# Patient Record
Sex: MALE | Race: WHITE
[De-identification: names, ages, dates, MRNs, and addresses within clinical notes are randomized per-mention and may not be internally consistent; named-entity substitution may affect disease eponyms.]

---

## 2018-06-09 ENCOUNTER — HOSPITAL ENCOUNTER (INPATIENT)
Dept: HOSPITAL 25 - ED | Age: 78
LOS: 4 days | Discharge: NURSING, REHAB OR LONG-TERM CARE | DRG: 470 | End: 2018-06-13
Attending: HOSPITALIST | Admitting: INTERNAL MEDICINE
Payer: MEDICARE

## 2018-06-09 DIAGNOSIS — D62: ICD-10-CM

## 2018-06-09 DIAGNOSIS — Z79.01: ICD-10-CM

## 2018-06-09 DIAGNOSIS — N40.0: ICD-10-CM

## 2018-06-09 DIAGNOSIS — Z89.022: ICD-10-CM

## 2018-06-09 DIAGNOSIS — I10: ICD-10-CM

## 2018-06-09 DIAGNOSIS — Z87.891: ICD-10-CM

## 2018-06-09 DIAGNOSIS — E78.5: ICD-10-CM

## 2018-06-09 DIAGNOSIS — S72.002A: Primary | ICD-10-CM
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
## 2018-06-09 DIAGNOSIS — Z79.82: ICD-10-CM

## 2018-06-09 DIAGNOSIS — Z85.820: ICD-10-CM
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
## 2018-06-09 LAB
BASOPHILS # BLD AUTO: 0 10^3/UL (ref 0–0.2)
EOSINOPHIL # BLD AUTO: 0 10^3/UL (ref 0–0.6)
HCT VFR BLD AUTO: 46 % (ref 42–52)
HGB BLD-MCNC: 15.5 G/DL (ref 14–18)
INR PPP/BLD: 0.99 (ref 0.77–1.02)
LYMPHOCYTES # BLD AUTO: 0.7 10^3/UL (ref 1–4.8)
MCH RBC QN AUTO: 32 PG (ref 27–31)
MCHC RBC AUTO-ENTMCNC: 34 G/DL (ref 31–36)
MCV RBC AUTO: 95 FL (ref 80–94)
MONOCYTES # BLD AUTO: 1 10^3/UL (ref 0–0.8)
NEUTROPHILS # BLD AUTO: 10.9 10^3/UL (ref 1.5–7.7)
NRBC # BLD AUTO: 0 10^3/UL
NRBC BLD QL AUTO: 0
PLATELET # BLD AUTO: 202 10^3/UL (ref 150–450)
RBC # BLD AUTO: 4.87 10^6/UL (ref 4–5.4)
WBC # BLD AUTO: 12.6 10^3/UL (ref 3.5–10.8)

## 2018-06-09 PROCEDURE — 93005 ELECTROCARDIOGRAM TRACING: CPT

## 2018-06-09 PROCEDURE — 85049 AUTOMATED PLATELET COUNT: CPT

## 2018-06-09 PROCEDURE — 88305 TISSUE EXAM BY PATHOLOGIST: CPT

## 2018-06-09 PROCEDURE — 85610 PROTHROMBIN TIME: CPT

## 2018-06-09 PROCEDURE — 85730 THROMBOPLASTIN TIME PARTIAL: CPT

## 2018-06-09 PROCEDURE — 36415 COLL VENOUS BLD VENIPUNCTURE: CPT

## 2018-06-09 PROCEDURE — C1713 ANCHOR/SCREW BN/BN,TIS/BN: HCPCS

## 2018-06-09 PROCEDURE — 80053 COMPREHEN METABOLIC PANEL: CPT

## 2018-06-09 PROCEDURE — 82550 ASSAY OF CK (CPK): CPT

## 2018-06-09 PROCEDURE — 0SRB02A REPLACEMENT OF LEFT HIP JOINT WITH METAL ON POLYETHYLENE SYNTHETIC SUBSTITUTE, UNCEMENTED, OPEN APPROACH: ICD-10-PCS | Performed by: ORTHOPAEDIC SURGERY

## 2018-06-09 PROCEDURE — 72170 X-RAY EXAM OF PELVIS: CPT

## 2018-06-09 PROCEDURE — 85014 HEMATOCRIT: CPT

## 2018-06-09 PROCEDURE — 71045 X-RAY EXAM CHEST 1 VIEW: CPT

## 2018-06-09 PROCEDURE — 88311 DECALCIFY TISSUE: CPT

## 2018-06-09 PROCEDURE — 85018 HEMOGLOBIN: CPT

## 2018-06-09 PROCEDURE — 80048 BASIC METABOLIC PNL TOTAL CA: CPT

## 2018-06-09 PROCEDURE — 99283 EMERGENCY DEPT VISIT LOW MDM: CPT

## 2018-06-09 PROCEDURE — C1776 JOINT DEVICE (IMPLANTABLE): HCPCS

## 2018-06-09 PROCEDURE — 85025 COMPLETE CBC W/AUTO DIFF WBC: CPT

## 2018-06-09 RX ADMIN — HEPARIN SODIUM SCH UNITS: 5000 INJECTION INTRAVENOUS; SUBCUTANEOUS at 21:36

## 2018-06-09 RX ADMIN — SODIUM CHLORIDE SCH MLS/HR: 900 IRRIGANT IRRIGATION at 13:33

## 2018-06-09 NOTE — RAD
INDICATION: Left leg and hip pain after a fall from a truck



COMPARISON: None.

 

TECHNIQUE: Single AP view of the chest was obtained.



FINDINGS: 



The heart and mediastinum exhibit normal size and contour. There is mild calcified

atherosclerosis overlying the arch of the aorta.



The lungs are grossly clear. There is no evidence of a large pleural effusion.



Visualized bones are normal for the patient's age.



IMPRESSION:  No radiographic evidence for acute cardiopulmonary abnormality on this single

AP view chest x-ray.

## 2018-06-09 NOTE — ED
Lower Extremity





- HPI Summary


HPI Summary: 


Patient is a 77-year-old male who presents emergency department for a left hip 

injury that occurred 3 days ago.  Patient states he was in the back of his 

pickup truck when he fell off and landed onto left hip.  He denies head injury 

or loss of consciousness.  Patient resides at home by himself with help.  

States he's been using a walker to ambulate the last few days.  Denies numbness

, tingling or weakness. Otherwise denies recent illness, chest pain, shortness 

of breath, abdominal pain, vomiting, diarrhea, urinary symptoms, fever, back 

pain. Is not anticoagulated. Medical history of hypertension and high 

cholesterol.  Symptoms are moderate in severity.  Walking makes symptoms worse.

  Rest makes symptoms better.  Pain is minimal at rest.








- History of Current Complaint


Chief Complaint: EDExtremityLower


Stated Complaint: LT HIP PAIN


Time Seen by Provider: 06/09/18 09:01


Hx Obtained From: Patient


Pain Intensity: 6





- Allergies/Home Medications


Allergies/Adverse Reactions: 


 Allergies











Allergy/AdvReac Type Severity Reaction Status Date / Time


 


No Known Allergies Allergy   Verified 08/27/15 10:38











Home Medications: 


 Home Medications





Aspirin EC TAB* [Ecotrin EC Low Dose 81 MG*] 81 mg PO DAILY 06/09/18 [History 

Confirmed 06/09/18]


Atorvastatin* [Lipitor*] 40 mg PO DAILY 06/09/18 [History Confirmed 06/09/18]


Lisinopril TAB* [Prinivil TAB*] 5 mg PO DAILY 06/09/18 [History Confirmed 06/09/ 18]











PMH/Surg Hx/FS Hx/Imm Hx


Previously Healthy: Yes


Sensory History: Reports: Hx Contacts or Glasses - GLASSES


   Denies: Hx Hearing Aid


Opthamlomology History: Reports: Hx Contacts or Glasses - GLASSES


Infectious Disease History: No


Infectious Disease History: 


   Denies: Traveled Outside the [place] in Last 30 Days





- Social History


Occupation: Retired


Lives: Alone


Alcohol Use: None


Substance Use Type: Reports: None


Smoking Status (MU): Former Smoker


Amount Used/How Often: 1 PPD X 20-30 YEARS


Have You Smoked in the Last Year: No





Review of Systems


Constitutional: Negative


Negative: Fever, Chills


Eyes: Negative


ENT: Negative


Cardiovascular: Negative


Negative: Palpitations, Chest Pain


Respiratory: Negative


Negative: Shortness Of Breath, Cough


Gastrointestinal: Negative


Negative: Abdominal Pain, Vomiting, Diarrhea, Nausea


Genitourinary: Negative


Positive: Other - Left hip pain


Negative: Weakness, Paresthesia, Numbness


All Other Systems Reviewed And Are Negative: Yes





Physical Exam


Triage Information Reviewed: Yes


Vital Signs On Initial Exam: 


 Initial Vitals











Temp Pulse Resp BP Pulse Ox


 


 98 F   77   18   151/96   90 


 


 06/09/18 08:51  06/09/18 08:51  06/09/18 08:51  06/09/18 08:51  06/09/18 08:51











Vital Signs Reviewed: Yes


Appearance: Positive: Well-Appearing - Patient lying in bed in no acute 

distress.  Talkative.  Pleasant.  Family present.


Head/Face: Positive: Normal Head/Face Inspection


Eyes: Positive: Normal


Neck: Positive: Supple


Respiratory/Lung Sounds: Positive: Clear to Auscultation, Breath Sounds Present


Cardiovascular: Positive: Normal, RRR


Abdomen Description: Positive: Nontender, Soft


Musculoskeletal: Positive: Other - Left lower extremity is shortened and 

externally rotated.  Good palpable pedal pulse.  No calf tenderness or leg 

edema.  No wounds or infection.


Neurological: Positive: Normal, CN Intact II-III


Psychiatric: Positive: Affect/Mood Appropriate





Diagnostics





- Vital Signs


 Vital Signs











  Temp Pulse Resp BP Pulse Ox


 


 06/09/18 08:51  98 F  77  18  151/96  90














- Laboratory


Lab Results: 


 Lab Results











  06/09/18 06/09/18 06/09/18 Range/Units





  09:09 09:09 09:32 


 


WBC  12.6 H    (3.5-10.8)  10^3/ul


 


RBC  4.87    (4.0-5.4)  10^6/ul


 


Hgb  15.5    (14.0-18.0)  g/dl


 


Hct  46    (42-52)  %


 


MCV  95 H    (80-94)  fL


 


MCH  32 H    (27-31)  pg


 


MCHC  34    (31-36)  g/dl


 


RDW  14    (10.5-15)  %


 


Plt Count  202    (150-450)  10^3/ul


 


MPV  7.4    (7.4-10.4)  um3


 


Neut % (Auto)  86.5 H    (38-83)  %


 


Lymph % (Auto)  5.2 L    (25-47)  %


 


Mono % (Auto)  7.9 H    (0-7)  %


 


Eos % (Auto)  0.1    (0-6)  %


 


Baso % (Auto)  0.3    (0-2)  %


 


Absolute Neuts (auto)  10.9 H    (1.5-7.7)  10^3/ul


 


Absolute Lymphs (auto)  0.7 L    (1.0-4.8)  10^3/ul


 


Absolute Monos (auto)  1.0 H    (0-0.8)  10^3/ul


 


Absolute Eos (auto)  0    (0-0.6)  10^3/ul


 


Absolute Basos (auto)  0    (0-0.2)  10^3/ul


 


Absolute Nucleated RBC  0    10^3/ul


 


Nucleated RBC %  0    


 


INR (Anticoag Therapy)   0.99   (0.77-1.02)  


 


Sodium    141  (139-145)  mmol/L


 


Potassium    4.2  (3.5-5.0)  mmol/L


 


Chloride    105  (101-111)  mmol/L


 


Carbon Dioxide    27  (22-32)  mmol/L


 


Anion Gap    9  (2-11)  mmol/L


 


BUN    22  (6-24)  mg/dL


 


Creatinine    1.22 H  (0.67-1.17)  mg/dL


 


Est GFR ( Amer)    74.1  (>60)  


 


Est GFR (Non-Af Amer)    57.6  (>60)  


 


BUN/Creatinine Ratio    18.0  (8-20)  


 


Glucose    122 H  ()  mg/dL


 


Calcium    9.7  (8.6-10.3)  mg/dL


 


Total Bilirubin    1.20 H  (0.2-1.0)  mg/dL


 


AST    43 H  (13-39)  U/L


 


ALT    30  (7-52)  U/L


 


Alkaline Phosphatase    74  ()  U/L


 


Total Creatine Kinase    878 H  ()  U/L


 


Total Protein    7.2  (6.4-8.9)  g/dL


 


Albumin    4.1  (3.2-5.2)  g/dL


 


Globulin    3.1  (2-4)  g/dL


 


Albumin/Globulin Ratio    1.3  (1-3)  











Result Diagrams: 


 06/09/18 09:09





 06/09/18 09:32


Lab Statement: Any lab studies that have been ordered have been reviewed, and 

results considered in the medical decision making process.





Lower Extremity Course/Dx





- Course


Course Of Treatment: Patient presenting to the ER for a left hip injury that 

occurred 3 days ago.  Suspect fracture.  Blood work and x-rays ordered.  IV was 

placed.  Patient declines pain medication at this time.  Patient otherwise has 

stable vital signs and is well appearing.  Labs show mild increase in WBC, 

creatinine and CK.  Hip x-ray shows a left impacted femoral neck fracture.  I 

spoke with on-call orthopedic surgeon, Dr. Holcomb, and will plan on taking pt. 

to the OR at some point.  Hospitalist, Dr. Shaw, was consulted and she has 

accepted pt. to her service.  Results and plan were discussed with patient and 

family.





- Diagnoses


Differential Diagnosis/HQI/PQRI: Positive: Contusion, Dislocation, Fracture (

Closed), Sprain, Strain


Provider Diagnoses: 


 Hip fracture








Discharge





- Sign-Out/Discharge


Documenting (check all that apply): Discharge/Admit/Transfer





- Discharge Plan


Condition: Stable


Disposition: ADMITTED TO [facility]


Referrals: 


Harjinder Hannah MD [Primary Care Provider] - 





- Billing Disposition and Condition


Condition: STABLE


Disposition: Admitted to [facility]

## 2018-06-09 NOTE — HP
CC:  Harjinder Hannah MD; Nicolás Holcomb MD *

 

HISTORY AND PHYSICAL:

 

DATE OF ADMISSION:  06/09/18

 

PRIMARY CARE PROVIDER:  Harjinder Hannah MD

 

ATTENDING PHYSICIAN:  Reshma Shaw MD * (dictated by Kaylen Eng NP)

 

CHIEF COMPLAINT:  Left hip pain after a fall.

 

HISTORY OF PRESENT ILLNESS: Mr. Grant is a 77-year-old male with past medical 
history significant for hypertension, hyperlipidemia, and melanoma, who 
presents to the emergency room with complaint of left hip pain.  The patient 
states that he had been in his usual state of health and while trying to get 
out of the back of his pickup truck on Thursday (3 days ago) he fell landing on 
his left hip.  He denies any loss of consciousness or head injury.  He 
initially tried to get up, but was unable to walk.  He then had assistance 
getting up and into the house, where he has been ambulating for the last few 
days with a walker.  He states the pain is okay when he is not walking, but 
worse if he moves his leg or is walking.  He denies any fevers, chills, chest 
pain, shortness of breath, cough, nausea, vomiting, diarrhea, lightheadedness, 
dizziness, urinary symptoms such as dysuria, urgency or frequency. He has been 
taking Motrin as needed for his pain.  Due to encouragement from his children, 
he decided to present to the emergency room this morning for further evaluation.

 

While in the emergency room, the patient had labs remarkable for a slightly 
elevated creatinine.  He has no labs.  I am unsure if this is his baseline or 
not. He has a slightly elevated total bilirubin and AST.  He had an EKG without 
acute findings.  Chest x-ray without acute findings.  He had a left hip and 
pelvis x-ray showing an impacted left femoral neck fracture showing a small 
degree of varus angulation and the hospice were asked to evaluate the patient 
for admission.

 

PAST MEDICAL HISTORY:

1.  Hypertension.

2.  Hyperlipidemia.

3.  Melanoma. PAST SURGICAL HISTORY:

1.  Status post excision of melanoma.

2.  Status post umbilical hernia repair.

3.  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

4.  Status post reconstruction of the left ankle after a crushing injury.

 

HOME MEDICATIONS:  Include:

1.  Atorvastatin 40 mg oral daily.

2.  Aspirin 81 mg oral daily, the patient last took on 06/08/18.

3.  Lisinopril 5 mg oral daily.

 

ALLERGIES:  No known drug allergies.

 

FAMILY HISTORY:  The patient denies any family history of coronary artery 
disease, diabetes mellitus, or cancer.

 

SOCIAL HISTORY:  The patient is a former smoker quitting approximately 20 to 30 
years ago.  Prior to that he has an approximate 20 year 1 pack a day smoking 
history.  He is a former alcoholic and quit drinking around the same time he 
stopped smoking 20 to 30 years ago.  He denies recreational drug use.  His 
daughters Mahnaz or Katrin will be surrogate decision maker if he is unable to 
make decisions for himself.

 

REVIEW OF SYSTEMS:  I performed an 11-point review of systems.  All the 
pertinent positives and negatives are mentioned in the history of present 
illness.  The remaining review of systems is negative.

 

                               PHYSICAL EXAMINATION

 

GENERAL APPEARANCE:  The patient is alert, pleasant, appears to be in no acute 
distress.

 

VITAL SIGNS:  Temperature 98, heart rate 77, respiratory rate 18, O2 sat 90% on 
room air, blood pressure 151/96.

 

HEENT:  Normocephalic, atraumatic.  Pupils are equal and reactive to light. 
Extraocular movements are intact.  There is no nystagmus.

 

RESPIRATORY:  There is no accessory muscle use.  The lungs are clear to 
auscultation bilateral.

 

CARDIOVASCULAR:  Regular rate and rhythm.  S1 and S2 present.  There are no 
murmurs, rubs, or gallops heard.

 

ABDOMEN:  Soft, nontender, nondistended.  There are bowel sounds present x4.

 

EXTREMITIES:  There is 1+ left ankle edema.  DP and PT pulses are 2+ and 
symmetric. The left foot is slightly cooler than the right.

 

MUSCULOSKELETAL:  There is no clubbing or cyanosis noted.  The patient has good 
dorsi and plantar flexion bilateral.  He is unable to move his left leg due to 
pain.  His left foot is slightly externally rotated.  The patient states at 
baseline it is always slightly externally rotated.  He has tenderness to 
palpation of his left hip.

 

NEUROLOGICAL:  The patient is alert and oriented x4.  Cranial nerves II through 
XII are grossly intact.

 

PSYCHOLOGICAL:  The patient is calm and cooperative.

 

SKIN:  There are no rashes or abnormalities seen.

 

 DIAGNOSTIC STUDIES/LABORATORY DATA:  Sodium 141, potassium 4.2, chloride 105, 
CO2 of 27, BUN 22, creatinine 1.22, glucose 122.  White blood cell count 12.6, 
hemoglobin 15.5, hematocrit 46, platelet count 202,000.  AST 43, ALT 30, total 
bilirubin of 1.20.  .

 

EKG shows a sinus rhythm, rate of 81.  There are no acute signs of ischemia.  
There are no previous EKGs for comparison.

 

Chest x-ray from today.  Radiologist's impression:  No radiographic evidence 
for acute cardiopulmonary abnormality on the single AP view chest x-ray.

 

Left hip and pelvis x-ray from today.  Radiologist impression:  Impacted left 
femoral neck fracture exhibiting a small degree of varus angulation.

 

IMPRESSION:  Mr. Grant is a 77-year-old male with a past medical history 
significant for melanoma, hypertension, and hyperlipidemia, who presented to 
the emergency room after a fall several days ago resulting in left hip pain.  
He will be admitted as an inpatient for a left hip fracture.

 

ASSESSMENT/PLAN:

1.  Left hip fracture.  The patient will be n.p.o. until we determine if 
Surgery will take him today or not.  He will be on bedrest.  He has had a 
urinary catheter placed.  We will provide him with pain medication or bowel 
regimen.  Orthopedics will consult on the patient.  According to the RCRI, the 
patient has 0 points placing him at a class 1 risk and a 0.4% risk of a major 
cardiac event.  He has a METs score of greater than 4.  He needs no further 
cardiac workup.  At this point, he is medically optimized to proceed to the 
operating room when Orthopedic Surgery is ready.  The patient last took a baby 
aspirin yesterday on 06/08/18.

2.  Hypertension.  Continue home lisinopril.

3.  Hyperlipidemia.  Continue home atorvastatin.

4.  Fluids, electrolytes, and nutrition.  The patient will be n.p.o. at this 
time. He will be on a heart healthy diet, though once he is able to take p.o. 5
  Code status.  Full code.

6.  DVT prophylaxis.  He is at highest risk.  I am going to place him on SCDs.  
If he is not going to go to surgery today, I will place him on subcu heparin 
and then hold after the evening dose.  If he is going to surgery today, I am 
going to hold on chemical DVT prophylaxis and then do prophylaxis per 
Orthopedic Surgery.

7.  Disposition.  Inpatient.

 

TIME SPENT:  Time for this admission was approximately 60 minutes, greater than 
half of that was spent with the patient and his daughter discussing medications
, past medical history, the events leading up to her arrival today, performing 
a physical examination.

 

The case has been reviewed with the attending, Dr. Shaw, who agrees with the 
plan of care.

 

Reviewed by BRYANNA JENKINS  06/10/18  1251

 

[id number] #: [id number]

MTDD

## 2018-06-09 NOTE — RAD
INDICATION: Left hip pain after a fall



COMPARISON: None

 

TECHNIQUE: 2 views of the left hip were obtained.



FINDINGS: 



There is an impacted fracture of the left femoral neck exhibiting varus deformity.

Remaining visualized bones are intact and appropriately aligned.



IMPRESSION:  Impacted left femoral neck fracture exhibiting a small degree of varus

angulation.

## 2018-06-10 LAB
BASOPHILS # BLD AUTO: 0 10^3/UL (ref 0–0.2)
EOSINOPHIL # BLD AUTO: 0.3 10^3/UL (ref 0–0.6)
HCT VFR BLD AUTO: 40 % (ref 42–52)
HGB BLD-MCNC: 13.9 G/DL (ref 14–18)
LYMPHOCYTES # BLD AUTO: 1 10^3/UL (ref 1–4.8)
MCH RBC QN AUTO: 32 PG (ref 27–31)
MCHC RBC AUTO-ENTMCNC: 35 G/DL (ref 31–36)
MCV RBC AUTO: 94 FL (ref 80–94)
MONOCYTES # BLD AUTO: 1.2 10^3/UL (ref 0–0.8)
NEUTROPHILS # BLD AUTO: 6.8 10^3/UL (ref 1.5–7.7)
NRBC # BLD AUTO: 0 10^3/UL
NRBC BLD QL AUTO: 0
PLATELET # BLD AUTO: 175 10^3/UL (ref 150–450)
RBC # BLD AUTO: 4.28 10^6/UL (ref 4–5.4)
WBC # BLD AUTO: 9.4 10^3/UL (ref 3.5–10.8)

## 2018-06-10 RX ADMIN — HEPARIN SODIUM SCH UNITS: 5000 INJECTION INTRAVENOUS; SUBCUTANEOUS at 13:30

## 2018-06-10 RX ADMIN — HEPARIN SODIUM SCH UNITS: 5000 INJECTION INTRAVENOUS; SUBCUTANEOUS at 06:05

## 2018-06-10 RX ADMIN — SODIUM CHLORIDE SCH MLS/HR: 900 IRRIGANT IRRIGATION at 00:02

## 2018-06-10 RX ADMIN — LISINOPRIL SCH MG: 5 TABLET ORAL at 08:50

## 2018-06-10 RX ADMIN — ATORVASTATIN CALCIUM SCH MG: 40 TABLET, FILM COATED ORAL at 08:50

## 2018-06-10 RX ADMIN — SODIUM CHLORIDE SCH MLS/HR: 900 IRRIGANT IRRIGATION at 10:12

## 2018-06-10 NOTE — PN
Progress Note





- Progress Note


Date of Service: 06/10/18


SOAP: 


Subjective:


Mr. Grant is a healthy and active 76 yo who fell off his pickup truck bed on 6/7 /18, he presented to [facility] ED on 6/9/18 and was found to have impacted left 

femoral neck fracture. He reports no pain at rest. Denies any chest pain, SOB, 

numbness or tingling. 








Objective:








 Vital Signs











Temp  98.9 F   06/10/18 07:36


 


Pulse  75   06/10/18 07:36


 


Resp  18   06/10/18 08:00


 


BP  138/77   06/10/18 07:36


 


Pulse Ox  94   06/10/18 07:36








 Intake & Output











 06/09/18 06/10/18 06/10/18





 18:59 06:59 18:59


 


Intake Total  1156 1464


 


Output Total 300 970 


 


Balance -


 


Weight 180 lb  


 


Intake:   


 


  IV Fluids  956 984


 


    NS (0.9%)  956 984


 


  Oral  200 480


 


Output:   


 


  Urine 300  


 


    Banuelos 16 Fr 300  


 


  Banuelos  970 








 Laboratory Results - last 24 hr











  06/09/18 06/10/18 06/10/18





  17:20 05:10 05:10


 


WBC   9.4 


 


RBC   4.28 


 


Hgb   13.9 L 


 


Hct   40 L 


 


MCV   94 


 


MCH   32 H 


 


MCHC   35 


 


RDW   14 


 


Plt Count   175 


 


MPV   7.7 


 


Neut % (Auto)   72.9 


 


Lymph % (Auto)   10.3 L 


 


Mono % (Auto)   13.0 H 


 


Eos % (Auto)   3.3 


 


Baso % (Auto)   0.5 


 


Absolute Neuts (auto)   6.8 


 


Absolute Lymphs (auto)   1.0 


 


Absolute Monos (auto)   1.2 H 


 


Absolute Eos (auto)   0.3 


 


Absolute Basos (auto)   0 


 


Absolute Nucleated RBC   0 


 


Nucleated RBC %   0 


 


APTT  30.6  


 


Sodium    139


 


Potassium    3.8


 


Chloride    107


 


Carbon Dioxide    26


 


Anion Gap    6


 


BUN    21


 


Creatinine    1.09


 


Est GFR ( Amer)    84.4


 


Est GFR (Non-Af Amer)    65.6


 


BUN/Creatinine Ratio    19.3


 


Glucose    104 H


 


Calcium    8.3 L


 


Total Bilirubin    1.10 H


 


AST    29


 


ALT    22


 


Alkaline Phosphatase    52


 


Total Protein    5.8 L


 


Albumin    3.2


 


Globulin    2.6


 


Albumin/Globulin Ratio    1.2











General: WN, WD, NAD, AO, lying comfortably in bed.


LLE: Skin of left hip is intact. Pain with palpation left anterior groin. Left 

leg externally rotated. +DF/PF. 2+ DP/PT pulse. SITLT.











Assessment:


Mr. Grant is a healthy and active 76 yo who sustained a left femoral neck 

fracture on 6/7/18. 








Plan:


- Discussed patient would likely have better outcome with TIFFANIE rather than 

hemiarthoplasty. Patient would like to proceed with procedure. 


- L TIFFANIE planned for 6/11/18 with Dr. Monahan. 


- Stop heparin at 6 pm tonight, NPO after midnight


- PT/OT after surgery

## 2018-06-10 NOTE — PN
Subjective


Date of Service: 06/10/18


Interval History: 





No pain.  Appetite OK.  





Objective


Active Medications: 








Acetaminophen (Tylenol Tab*)  650 mg PO Q6H PRN


   PRN Reason: FEVER/PAIN


Atorvastatin Calcium (Lipitor*)  40 mg PO DAILY [facility]


   Last Admin: 06/10/18 08:50 Dose:  40 mg


Heparin Sodium (Porcine) (Heparin Vial(*))  5,000 units SUBCUT Q8HR [facility]


   Stop: 06/10/18 21:00


   Last Admin: 06/10/18 06:05 Dose:  5,000 units


Sodium Chloride (Ns 0.9% 1000 Ml*)  1,000 mls @ 0 mls/hr IV PER RATE [facility]


   PRN Reason: KVO


Lisinopril (Prinivil Tab*)  5 mg PO DAILY [facility]


   Last Admin: 06/10/18 08:50 Dose:  5 mg


Magnesium Hydroxide (Milk Of Magnesia Liq*)  30 ml PO Q6H PRN


   PRN Reason: CONSTIPATION








 Vital Signs - 8 hr











  06/10/18 06/10/18 06/10/18





  07:34 07:36 08:00


 


Temperature  98.9 F 


 


Pulse Rate  75 


 


Respiratory 20 16 18





Rate   


 


Blood Pressure  138/77 





(mmHg)   


 


O2 Sat by Pulse  94 





Oximetry   














  06/10/18





  11:44


 


Temperature 98.2 F


 


Pulse Rate 75


 


Respiratory 20





Rate 


 


Blood Pressure 159/83





(mmHg) 


 


O2 Sat by Pulse 92





Oximetry 











Oxygen Devices in Use Now: Nasal Cannula


Appearance: Alert, suppine in bed.  In good spirits.  Looks comfortable.


Eyes: No Scleral Icterus


Respiratory: Symmetrical Chest Expansion and Respiratory Effort, Clear to 

Auscultation, Clear to Percussion


Cardiovascular: NL Sounds; No Murmurs; No JVD, RRR, No Edema, -


Skin: No Rash or Ulcers, No Nodules or Sclerosis, -


Neurological: Alert and Oriented x 3, NL Sensation


Result Diagrams: 


 06/10/18 05:10





 06/10/18 05:10


Additional Lab and Data: 


 Lab Results











  06/09/18 06/09/18 06/09/18 Range/Units





  09:09 09:09 09:32 


 


WBC  12.6 H    (3.5-10.8)  10^3/ul


 


RBC  4.87    (4.0-5.4)  10^6/ul


 


Hgb  15.5    (14.0-18.0)  g/dl


 


Hct  46    (42-52)  %


 


MCV  95 H    (80-94)  fL


 


MCH  32 H    (27-31)  pg


 


MCHC  34    (31-36)  g/dl


 


RDW  14    (10.5-15)  %


 


Plt Count  202    (150-450)  10^3/ul


 


MPV  7.4    (7.4-10.4)  um3


 


Neut % (Auto)  86.5 H    (38-83)  %


 


Lymph % (Auto)  5.2 L    (25-47)  %


 


Mono % (Auto)  7.9 H    (0-7)  %


 


Eos % (Auto)  0.1    (0-6)  %


 


Baso % (Auto)  0.3    (0-2)  %


 


Absolute Neuts (auto)  10.9 H    (1.5-7.7)  10^3/ul


 


Absolute Lymphs (auto)  0.7 L    (1.0-4.8)  10^3/ul


 


Absolute Monos (auto)  1.0 H    (0-0.8)  10^3/ul


 


Absolute Eos (auto)  0    (0-0.6)  10^3/ul


 


Absolute Basos (auto)  0    (0-0.2)  10^3/ul


 


Absolute Nucleated RBC  0    10^3/ul


 


Nucleated RBC %  0    


 


INR (Anticoag Therapy)   0.99   (0.77-1.02)  


 


Sodium    141  (139-145)  mmol/L


 


Potassium    4.2  (3.5-5.0)  mmol/L


 


Chloride    105  (101-111)  mmol/L


 


Carbon Dioxide    27  (22-32)  mmol/L


 


Anion Gap    9  (2-11)  mmol/L


 


BUN    22  (6-24)  mg/dL


 


Creatinine    1.22 H  (0.67-1.17)  mg/dL


 


Est GFR ( Amer)    74.1  (>60)  


 


Est GFR (Non-Af Amer)    57.6  (>60)  


 


BUN/Creatinine Ratio    18.0  (8-20)  


 


Glucose    122 H  ()  mg/dL


 


Calcium    9.7  (8.6-10.3)  mg/dL


 


Total Bilirubin    1.20 H  (0.2-1.0)  mg/dL


 


AST    43 H  (13-39)  U/L


 


ALT    30  (7-52)  U/L


 


Alkaline Phosphatase    74  ()  U/L


 


Total Creatine Kinase    878 H  ()  U/L


 


Total Protein    7.2  (6.4-8.9)  g/dL


 


Albumin    4.1  (3.2-5.2)  g/dL


 


Globulin    3.1  (2-4)  g/dL


 


Albumin/Globulin Ratio    1.3  (1-3)  














Assess/Plan/Problems-Billing


Assessment: 











- Patient Problems


(1) Hip fracture, left


Current Visit: Yes   Status: Acute   Code(s): S72.002A - FRACTURE OF UNSP PART 

OF NECK OF LEFT FEMUR, INIT   SNOMED Code(s): 551570720


   Comment: Plan is for TIFFANIE 6/11/18.  Not using any analgesics.    





(2) HTN (hypertension)


Current Visit: Yes   Status: Acute   Code(s): I10 - ESSENTIAL (PRIMARY) 

HYPERTENSION   SNOMED Code(s): 70557951


   Comment: Continue home dose lisniopril.

## 2018-06-11 RX ADMIN — LISINOPRIL SCH: 5 TABLET ORAL at 09:05

## 2018-06-11 RX ADMIN — DOCUSATE SODIUM SCH MG: 100 CAPSULE, LIQUID FILLED ORAL at 19:55

## 2018-06-11 RX ADMIN — MAGNESIUM HYDROXIDE SCH: 400 SUSPENSION ORAL at 19:58

## 2018-06-11 RX ADMIN — CEFAZOLIN SODIUM SCH MLS/HR: 1 SOLUTION INTRAVENOUS at 17:42

## 2018-06-11 RX ADMIN — ATORVASTATIN CALCIUM SCH: 40 TABLET, FILM COATED ORAL at 09:05

## 2018-06-11 NOTE — RAD
HISTORY: S/P LTHA, history of left hip trauma and fracture



COMPARISONS: June 09, 2018



VIEWS: 3, Frontal view of the pelvis with frontal and crosstable lateral views of the left

hip



FINDINGS:



BONE DENSITY: Normal.

BONES: The patient is status post left hip arthroplasty. There is no hardware failure or

osteolysis.

JOINTS: The patient is status post left hip arthroplasty. There is mild osteoarthritis of

the right hip.

ALIGNMENT: There is no dislocation. 

SOFT TISSUES: Unremarkable.



OTHER FINDINGS: None.



IMPRESSION: 

STATUS POST LEFT HIP ARTHROPLASTY

## 2018-06-11 NOTE — PN
Subjective


Date of Service: 06/11/18


Interval History: 





Pt is feeling well. He denies any pain. No SOB. He denies any nausea post-op.





Objective


Active Medications: 








Acetaminophen (Tylenol Tab*)  650 mg PO Q6H PRN


   PRN Reason: FEVER/PAIN


Acetaminophen (Tylenol Tab*)  650 mg PO Q4H PRN


   PRN Reason: PAIN OR TEMPERATURE


Atorvastatin Calcium (Lipitor*)  40 mg PO DAILY [facility]


   Last Admin: 06/11/18 09:05 Dose:  Not Given


Bisacodyl (Dulcolax Supp*)  10 mg NV DAILY PRN


   PRN Reason: constipation


Cyclobenzaprine HCl (Flexeril Tab*)  5 mg PO TID PRN


   PRN Reason: SPASMS


Diphenhydramine HCl (Benadryl Iv*)  25 mg IV Q6H PRN


   PRN Reason: itching


Docusate Sodium (Colace Cap*)  100 mg PO BID [facility]


Enoxaparin Sodium (Lovenox(*))  30 mg SUBCUT 1300 [facility]


Sodium Chloride (Ns 0.9% 1000 Ml*)  1,000 mls @ 0 mls/hr IV PER RATE [facility]


   PRN Reason: KVO


Lactated Ringer's (Lactated Ringers 1000 Ml Bag*)  1,000 mls @ 125 mls/hr IV 

PER RATE [facility]


   Last Admin: 06/11/18 09:18 Dose:  125 mls/hr


Cefazolin Sodium/Dextrose (Kefzol 1 Gm In Dextrose Duplex (*))  1 gm in 50 mls 

@ 200 mls/hr IVPB Q8H [facility]


   Stop: 06/12/18 10:14


Lactated Ringer's (Lactated Ringers 1000 Ml Bag*)  1,000 mls @ 100 mls/hr IV 

PER RATE [facility]


   Last Admin: 06/11/18 13:57 Dose:  100 mls/hr


Lactulose (Lactulose*)  30 ml PO Q6H PRN


   PRN Reason: constipation


Lisinopril (Prinivil Tab*)  5 mg PO DAILY [facility]


   Last Admin: 06/11/18 09:05 Dose:  Not Given


Magnesium Hydroxide (Milk Of Magnesia Liq*)  30 ml PO Q6H PRN


   PRN Reason: CONSTIPATION


Magnesium Hydroxide (Milk Of Magnesia Liq*)  30 ml PO BID [facility]


Magnesium Hydroxide (Milk Of Magnesia Liq*)  30 ml PO Q6H PRN


   PRN Reason: constipation


Morphine Sulfate (Morphine Inj (Syringe)*)  2 mg IV Q2H PRN


   PRN Reason: PAIN


Multivitamins (Theragran Tab*)  1 tab PO DAILY [facility]


Naloxone HCl (Narcan*)  0.08 mg IV Q2M PRN


   PRN Reason: severe induced resp depression


   Stop: 06/12/18 11:19


Ondansetron HCl (Zofran Inj*)  4 mg IV Q6H PRN


   PRN Reason: nausea


Oxycodone HCl (Roxycodone Tab*)  10 mg PO Q4H PRN


   PRN Reason: PAIN - SEVERE


Oxycodone/Acetaminophen (Percocet 5/325 Tab*)  2 tab PO Q4H PRN


   PRN Reason: PAIN


Oxycodone/Acetaminophen (Percocet 5/325 Tab*)  1 tab PO Q4H PRN


   PRN Reason: PAIN


Warfarin Sodium (Coumadin Tab(*))  6 mg PO ONCE@1700 [facility]


   PRN Reason: Protocol


   Stop: 06/11/18 17:01








 Vital Signs - 8 hr











  06/11/18 06/11/18 06/11/18





  07:18 08:28 12:14


 


Temperature 98.3 F  97.5 F


 


Pulse Rate 76  69


 


Respiratory 16 16 14





Rate   


 


Blood Pressure 146/85  





(mmHg)   


 


O2 Sat by Pulse 92  91





Oximetry   














  06/11/18 06/11/18 06/11/18





  12:15 12:20 12:25


 


Temperature   


 


Pulse Rate 71 66 68


 


Respiratory 10 14 16





Rate   


 


Blood Pressure 135/62 155/79 143/84





(mmHg)   


 


O2 Sat by Pulse 93 95 94





Oximetry   














  06/11/18 06/11/18 06/11/18





  12:30 12:35 12:40


 


Temperature   


 


Pulse Rate 68 72 


 


Respiratory 16 16 16





Rate   


 


Blood Pressure 124/76  





(mmHg)   


 


O2 Sat by Pulse 94 93 





Oximetry   














  06/11/18 06/11/18 06/11/18





  12:45 12:46 12:50


 


Temperature   


 


Pulse Rate 72 70 72


 


Respiratory 17 18 25





Rate   


 


Blood Pressure  151/75 





(mmHg)   


 


O2 Sat by Pulse 92 91 92





Oximetry   














  06/11/18 06/11/18 06/11/18





  12:55 13:00 13:05


 


Temperature  96.8 F 


 


Pulse Rate 65 64 68


 


Respiratory 16 17 16





Rate   


 


Blood Pressure  149/77 





(mmHg)   


 


O2 Sat by Pulse 93 93 94





Oximetry   














  06/11/18





  13:28


 


Temperature 96.7 F


 


Pulse Rate 65


 


Respiratory 16





Rate 


 


Blood Pressure 158/73





(mmHg) 


 


O2 Sat by Pulse 98





Oximetry 











Oxygen Devices in Use Now: Nasal Cannula


Appearance: Elderly male sitting up in bed, NAD


Eyes: No Scleral Icterus


Ears/Nose/Mouth/Throat: Mucous Membranes Moist


Respiratory: Symmetrical Chest Expansion and Respiratory Effort, Clear to 

Auscultation


Cardiovascular: NL Sounds; No Murmurs; No JVD, RRR, No Edema


Abdominal: NL Sounds; No Tenderness; No Distention


Extremities: No Clubbing, Cyanosis


Skin: No Nodules or Sclerosis, - - L hip wound covered in clean dry dressing


Neurological: Alert and Oriented x 3


Result Diagrams: 


 06/10/18 05:10





 06/10/18 05:10


Additional Lab and Data: 


 Lab Results











  06/09/18 06/09/18 06/09/18 Range/Units





  09:09 09:09 09:32 


 


WBC  12.6 H    (3.5-10.8)  10^3/ul


 


RBC  4.87    (4.0-5.4)  10^6/ul


 


Hgb  15.5    (14.0-18.0)  g/dl


 


Hct  46    (42-52)  %


 


MCV  95 H    (80-94)  fL


 


MCH  32 H    (27-31)  pg


 


MCHC  34    (31-36)  g/dl


 


RDW  14    (10.5-15)  %


 


Plt Count  202    (150-450)  10^3/ul


 


MPV  7.4    (7.4-10.4)  um3


 


Neut % (Auto)  86.5 H    (38-83)  %


 


Lymph % (Auto)  5.2 L    (25-47)  %


 


Mono % (Auto)  7.9 H    (0-7)  %


 


Eos % (Auto)  0.1    (0-6)  %


 


Baso % (Auto)  0.3    (0-2)  %


 


Absolute Neuts (auto)  10.9 H    (1.5-7.7)  10^3/ul


 


Absolute Lymphs (auto)  0.7 L    (1.0-4.8)  10^3/ul


 


Absolute Monos (auto)  1.0 H    (0-0.8)  10^3/ul


 


Absolute Eos (auto)  0    (0-0.6)  10^3/ul


 


Absolute Basos (auto)  0    (0-0.2)  10^3/ul


 


Absolute Nucleated RBC  0    10^3/ul


 


Nucleated RBC %  0    


 


INR (Anticoag Therapy)   0.99   (0.77-1.02)  


 


Sodium    141  (139-145)  mmol/L


 


Potassium    4.2  (3.5-5.0)  mmol/L


 


Chloride    105  (101-111)  mmol/L


 


Carbon Dioxide    27  (22-32)  mmol/L


 


Anion Gap    9  (2-11)  mmol/L


 


BUN    22  (6-24)  mg/dL


 


Creatinine    1.22 H  (0.67-1.17)  mg/dL


 


Est GFR ( Amer)    74.1  (>60)  


 


Est GFR (Non-Af Amer)    57.6  (>60)  


 


BUN/Creatinine Ratio    18.0  (8-20)  


 


Glucose    122 H  ()  mg/dL


 


Calcium    9.7  (8.6-10.3)  mg/dL


 


Total Bilirubin    1.20 H  (0.2-1.0)  mg/dL


 


AST    43 H  (13-39)  U/L


 


ALT    30  (7-52)  U/L


 


Alkaline Phosphatase    74  ()  U/L


 


Total Creatine Kinase    878 H  ()  U/L


 


Total Protein    7.2  (6.4-8.9)  g/dL


 


Albumin    4.1  (3.2-5.2)  g/dL


 


Globulin    3.1  (2-4)  g/dL


 


Albumin/Globulin Ratio    1.3  (1-3)  














Assess/Plan/Problems-Billing


Mr Grant is a 78 yo M who has a h/o HTN and HLD who presented to the ER after 

falling getting out of his  truck 3 days prior and was found to have a L 

hip fracture. 





- Patient Problems


(1) Hip fracture, left


Current Visit: Yes   Status: Acute   Code(s): S72.002A - FRACTURE OF UNSP PART 

OF NECK OF LEFT FEMUR, INIT   SNOMED Code(s): 593735195


   Comment: The patient is s/p L TIFFANIE today. He is not requiring any pain 

medications. PT to start tomorrow. DVT prophylaxis is lovenox bridge to 

coumadin.   





(2) HTN (hypertension)


Current Visit: Yes   Status: Acute   Code(s): I10 - ESSENTIAL (PRIMARY) 

HYPERTENSION   SNOMED Code(s): 36137523


   Comment: BP is mildly elevated. Continue home dose of lisinopril for now and 

if BP still up tomorrow will increase lisinopril tomorrow.   





(3) HLD (hyperlipidemia)


Current Visit: Yes   Status: Acute   Code(s): E78.5 - HYPERLIPIDEMIA, 

UNSPECIFIED   SNOMED Code(s): 88892863


   Comment: Continue lipitor.    





(4) DVT prophylaxis


Current Visit: Yes   Status: Acute   Code(s): FZW5790 -    SNOMED Code(s): 

221185882


   Comment: Lovenox to start tomorrow.   





(5) Full code status


Current Visit: Yes   Status: Acute   Code(s): Z78.9 - OTHER SPECIFIED HEALTH 

STATUS   SNOMED Code(s): 096522312

## 2018-06-12 LAB
BASOPHILS # BLD AUTO: 0 10^3/UL (ref 0–0.2)
EOSINOPHIL # BLD AUTO: 0 10^3/UL (ref 0–0.6)
HCT VFR BLD AUTO: 37 % (ref 42–52)
HGB BLD-MCNC: 12.7 G/DL (ref 14–18)
INR PPP/BLD: 1.13 (ref 0.77–1.02)
LYMPHOCYTES # BLD AUTO: 1 10^3/UL (ref 1–4.8)
MCH RBC QN AUTO: 32 PG (ref 27–31)
MCHC RBC AUTO-ENTMCNC: 34 G/DL (ref 31–36)
MCV RBC AUTO: 93 FL (ref 80–94)
MONOCYTES # BLD AUTO: 1.6 10^3/UL (ref 0–0.8)
NEUTROPHILS # BLD AUTO: 8.8 10^3/UL (ref 1.5–7.7)
NRBC # BLD AUTO: 0 10^3/UL
NRBC BLD QL AUTO: 0
PLATELET # BLD AUTO: 203 10^3/UL (ref 150–450)
RBC # BLD AUTO: 3.98 10^6/UL (ref 4–5.4)
WBC # BLD AUTO: 11.4 10^3/UL (ref 3.5–10.8)

## 2018-06-12 RX ADMIN — MAGNESIUM HYDROXIDE SCH ML: 400 SUSPENSION ORAL at 19:24

## 2018-06-12 RX ADMIN — OXYCODONE HYDROCHLORIDE AND ACETAMINOPHEN PRN TAB: 5; 325 TABLET ORAL at 19:24

## 2018-06-12 RX ADMIN — CEFAZOLIN SODIUM SCH MLS/HR: 1 SOLUTION INTRAVENOUS at 02:04

## 2018-06-12 RX ADMIN — LISINOPRIL SCH MG: 5 TABLET ORAL at 09:23

## 2018-06-12 RX ADMIN — ATORVASTATIN CALCIUM SCH MG: 40 TABLET, FILM COATED ORAL at 09:22

## 2018-06-12 RX ADMIN — CEFAZOLIN SODIUM SCH MLS/HR: 1 SOLUTION INTRAVENOUS at 09:22

## 2018-06-12 RX ADMIN — OXYCODONE HYDROCHLORIDE AND ACETAMINOPHEN PRN TAB: 5; 325 TABLET ORAL at 05:57

## 2018-06-12 RX ADMIN — DOCUSATE SODIUM SCH MG: 100 CAPSULE, LIQUID FILLED ORAL at 09:23

## 2018-06-12 RX ADMIN — THERA TABS SCH TAB: TAB at 09:23

## 2018-06-12 RX ADMIN — DOCUSATE SODIUM SCH MG: 100 CAPSULE, LIQUID FILLED ORAL at 19:24

## 2018-06-12 RX ADMIN — MAGNESIUM HYDROXIDE SCH: 400 SUSPENSION ORAL at 09:24

## 2018-06-12 RX ADMIN — OXYCODONE HYDROCHLORIDE AND ACETAMINOPHEN PRN TAB: 5; 325 TABLET ORAL at 12:59

## 2018-06-12 NOTE — PN
Subjective


Date of Service: 06/12/18


Interval History: 





Pt is feeling well. He has minimal pain. He did take a pain medication earlier 

today which he said overall made him feel better. He thinks he will take a dose 

again this evening. He has not had a BM today but thinks he will tomorrow. 





Objective


Active Medications: 








Acetaminophen (Tylenol Tab*)  650 mg PO Q6H PRN


   PRN Reason: FEVER/PAIN


   Last Admin: 06/11/18 19:55 Dose:  650 mg


Acetaminophen (Tylenol Tab*)  650 mg PO Q4H PRN


   PRN Reason: PAIN OR TEMPERATURE


Atorvastatin Calcium (Lipitor*)  40 mg PO DAILY [facility]


   Last Admin: 06/12/18 09:22 Dose:  40 mg


Bisacodyl (Dulcolax Supp*)  10 mg MA DAILY [facility]


   PRN Reason: constipation


Cyclobenzaprine HCl (Flexeril Tab*)  5 mg PO TID PRN


   PRN Reason: SPASMS


Diphenhydramine HCl (Benadryl Iv*)  25 mg IV Q6H PRN


   PRN Reason: itching


Docusate Sodium (Colace Cap*)  100 mg PO BID [facility]


   Last Admin: 06/12/18 09:23 Dose:  100 mg


Enoxaparin Sodium (Lovenox(*))  30 mg SUBCUT 1300 [facility]


   Last Admin: 06/12/18 13:00 Dose:  30 mg


Sodium Chloride (Ns 0.9% 1000 Ml*)  1,000 mls @ 0 mls/hr IV PER RATE [facility]


   PRN Reason: KVO


Lactated Ringer's (Lactated Ringers 1000 Ml Bag*)  1,000 mls @ 125 mls/hr IV 

PER RATE [facility]


   Last Admin: 06/12/18 00:41 Dose:  125 mls/hr


Lactated Ringer's (Lactated Ringers 1000 Ml Bag*)  1,000 mls @ 100 mls/hr IV 

PER RATE [facility]


   Last Admin: 06/11/18 13:57 Dose:  100 mls/hr


Lactulose (Lactulose*)  30 ml PO Q6H PRN


   PRN Reason: constipation


Lisinopril (Prinivil Tab*)  5 mg PO DAILY [facility]


   Last Admin: 06/12/18 09:23 Dose:  5 mg


Magnesium Hydroxide (Milk Of Magnesia Liq*)  30 ml PO Q6H PRN


   PRN Reason: CONSTIPATION


Magnesium Hydroxide (Milk Of Magnesia Liq*)  30 ml PO BID [facility]


   Last Admin: 06/12/18 09:24 Dose:  Not Given


Magnesium Hydroxide (Milk Of Magnesia Liq*)  30 ml PO Q6H PRN


   PRN Reason: constipation


Morphine Sulfate (Morphine Inj (Syringe)*)  2 mg IV Q2H PRN


   PRN Reason: PAIN


Multivitamins (Theragran Tab*)  1 tab PO DAILY [facility]


   Last Admin: 06/12/18 09:23 Dose:  1 tab


Ondansetron HCl (Zofran Inj*)  4 mg IV Q6H PRN


   PRN Reason: nausea


Oxycodone HCl (Roxycodone Tab*)  10 mg PO Q4H PRN


   PRN Reason: PAIN - SEVERE


Oxycodone/Acetaminophen (Percocet 5/325 Tab*)  2 tab PO Q4H PRN


   PRN Reason: PAIN


Oxycodone/Acetaminophen (Percocet 5/325 Tab*)  1 tab PO Q4H PRN


   PRN Reason: PAIN


   Last Admin: 06/12/18 12:59 Dose:  1 tab








 Vital Signs - 8 hr











  06/12/18 06/12/18 06/12/18





  11:06 12:59 15:27


 


Temperature   98.2 F


 


Pulse Rate 75  86


 


Respiratory 16 18 17





Rate   


 


Blood Pressure 135/55  127/68





(mmHg)   


 


O2 Sat by Pulse 93  92





Oximetry   














  06/12/18





  15:31


 


Temperature 


 


Pulse Rate 


 


Respiratory 18





Rate 


 


Blood Pressure 





(mmHg) 


 


O2 Sat by Pulse 





Oximetry 











Oxygen Devices in Use Now: None


Appearance: Elderly male sitting on the edge of the bed, NAD


Eyes: No Scleral Icterus


Ears/Nose/Mouth/Throat: Mucous Membranes Moist


Respiratory: Symmetrical Chest Expansion and Respiratory Effort, Clear to 

Auscultation


Cardiovascular: NL Sounds; No Murmurs; No JVD, RRR, No Edema


Abdominal: NL Sounds; No Tenderness; No Distention


Extremities: No Clubbing, Cyanosis


Skin: No Nodules or Sclerosis, - - L hip dressing clean and dry


Neurological: Alert and Oriented x 3


Result Diagrams: 


 06/12/18 06:14





 06/12/18 06:14


Additional Lab and Data: 


 Lab Results











  06/09/18 06/09/18 06/09/18 Range/Units





  09:09 09:09 09:32 


 


WBC  12.6 H    (3.5-10.8)  10^3/ul


 


RBC  4.87    (4.0-5.4)  10^6/ul


 


Hgb  15.5    (14.0-18.0)  g/dl


 


Hct  46    (42-52)  %


 


MCV  95 H    (80-94)  fL


 


MCH  32 H    (27-31)  pg


 


MCHC  34    (31-36)  g/dl


 


RDW  14    (10.5-15)  %


 


Plt Count  202    (150-450)  10^3/ul


 


MPV  7.4    (7.4-10.4)  um3


 


Neut % (Auto)  86.5 H    (38-83)  %


 


Lymph % (Auto)  5.2 L    (25-47)  %


 


Mono % (Auto)  7.9 H    (0-7)  %


 


Eos % (Auto)  0.1    (0-6)  %


 


Baso % (Auto)  0.3    (0-2)  %


 


Absolute Neuts (auto)  10.9 H    (1.5-7.7)  10^3/ul


 


Absolute Lymphs (auto)  0.7 L    (1.0-4.8)  10^3/ul


 


Absolute Monos (auto)  1.0 H    (0-0.8)  10^3/ul


 


Absolute Eos (auto)  0    (0-0.6)  10^3/ul


 


Absolute Basos (auto)  0    (0-0.2)  10^3/ul


 


Absolute Nucleated RBC  0    10^3/ul


 


Nucleated RBC %  0    


 


INR (Anticoag Therapy)   0.99   (0.77-1.02)  


 


Sodium    141  (139-145)  mmol/L


 


Potassium    4.2  (3.5-5.0)  mmol/L


 


Chloride    105  (101-111)  mmol/L


 


Carbon Dioxide    27  (22-32)  mmol/L


 


Anion Gap    9  (2-11)  mmol/L


 


BUN    22  (6-24)  mg/dL


 


Creatinine    1.22 H  (0.67-1.17)  mg/dL


 


Est GFR ( Amer)    74.1  (>60)  


 


Est GFR (Non-Af Amer)    57.6  (>60)  


 


BUN/Creatinine Ratio    18.0  (8-20)  


 


Glucose    122 H  ()  mg/dL


 


Calcium    9.7  (8.6-10.3)  mg/dL


 


Total Bilirubin    1.20 H  (0.2-1.0)  mg/dL


 


AST    43 H  (13-39)  U/L


 


ALT    30  (7-52)  U/L


 


Alkaline Phosphatase    74  ()  U/L


 


Total Creatine Kinase    878 H  ()  U/L


 


Total Protein    7.2  (6.4-8.9)  g/dL


 


Albumin    4.1  (3.2-5.2)  g/dL


 


Globulin    3.1  (2-4)  g/dL


 


Albumin/Globulin Ratio    1.3  (1-3)  














Assess/Plan/Problems-Billing


Mr Grant is a 76 yo M who has a h/o HTN and HLD who presented to the ER after 

falling getting out of his  truck 3 days prior and was found to have a L 

hip fracture. 





- Patient Problems


(1) Hip fracture, left


Current Visit: Yes   Status: Acute   Code(s): S72.002A - FRACTURE OF UNSP PART 

OF NECK OF LEFT FEMUR, INIT   SNOMED Code(s): 366755095


   Comment: The patient is s/p L TIFFANIE 6/11/18. Plan is for the patient to go to 

[facility]U tomorrow. DVT prophylaxis is lovenox bridge to coumadin.   





(2) HTN (hypertension)


Current Visit: Yes   Status: Acute   Code(s): I10 - ESSENTIAL (PRIMARY) 

HYPERTENSION   SNOMED Code(s): 47297202


   Comment: BP is under good control. Continue lisinopril at home dose.    





(3) HLD (hyperlipidemia)


Current Visit: Yes   Status: Acute   Code(s): E78.5 - HYPERLIPIDEMIA, 

UNSPECIFIED   SNOMED Code(s): 16628301


   Comment: Continue lipitor.    





(4) DVT prophylaxis


Current Visit: Yes   Status: Acute   Code(s): FTA9192 -    SNOMED Code(s): 

946743670


   Comment: Lovenox bridge to coumadin   





(5) Full code status


Current Visit: Yes   Status: Acute   Code(s): Z78.9 - OTHER SPECIFIED HEALTH 

STATUS   SNOMED Code(s): 750311319

## 2018-06-12 NOTE — PN
Progress Note





- Progress Note


Date of Service: 06/12/18


SOAP: 


Subjective:


[Pt is a 78 y/o male who sustained a Left hip fracture that was treated with a 

left total hip arthroplasty by Dr. Monahan on 06/11/2018. The pt was seen 

laying in bed this morning. He states that he has had no pain today. He feels 

well and was able to sleep very well last night. The pt denies any SOB, chest 

pain.]








Objective:


[General: Pt is alert, awake and oriented. NAD 


MSK: LLE: Dressing is clean, dry and intact. calf is soft and non tender. Pt is 

able to df/pf. sensation is intact to light touch distal to the incision. DP is 

2+.]





 Vital Signs











Temp  98.5 F   06/12/18 03:46


 


Pulse  72   06/12/18 03:46


 


Resp  16   06/12/18 05:57


 


BP  144/75   06/12/18 03:46


 


Pulse Ox  95   06/12/18 03:46








 Intake & Output











 06/11/18 06/12/18 06/12/18





 18:59 06:59 18:59


 


Intake Total 2404 1585 


 


Output Total 725 1175 


 


Balance 1679 410 


 


Intake:   


 


  IV Fluids 2284 950 


 


    LR 1400 950 


 


    NS (0.9%) 884  


 


  IVPB  55 


 


    ABX - CEFAZOLIN  55 


 


  Oral 120 580 


 


Output:   


 


  Urine  0 


 


  Banuelos 725 1175 


 


Other:   


 


  # Bowel Movements  0 














Assessment:


[S/P Left hip fracture treated with LTHA on 06/11/2018]








Plan:


[-Pt to start PT/OT today 


- Continue with coumadin for DVT prophylaxis


- Reviewed hip precautions with pt today 


- Pain medication as needed ]

## 2018-06-12 NOTE — OP
DATE OF OPERATION:  06/11/18 - ROOM #340

 

DATE OF BIRTH:  [birth year]

 

SURGEON:  Kar Monahan MD

 

ASSISTANT:  NUNO Saldaña

 

ANESTHESIA:  General.

 

PRE-OP DIAGNOSIS:  Displaced left femoral fracture.

 

POST-OP DIAGNOSIS:  Displaced left femoral fracture.

 

OPERATIVE PROCEDURE:  Left total hip arthroplasty.

 

INDICATIONS:  Mr. Grant is a 77-year-old male who had fallen and landed hard on 
his left hip.  He had significant pain with weightbearing and eventually did 
come to the emergency room here at [facility].  X-rays were taken, which found a 
displaced left femoral neck fracture.  Dr. Holcomb was on call and had discussed 
with him that a total hip arthroplasty should work well to decrease his pain 
and improve his function.  When I met him this morning, I discussed with him 
additional risks of surgery such as infection, scar formation, stiffness, DVT, 
pulmonary embolism, leg length discrepancy, instability as well as infection 
and continued pain as he did not have an arthritic hip prior to his fall.  He 
still wished to proceed.

 

ESTIMATED BLOOD LOSS:  200 cc.

 

COMPLICATIONS:  None.

 

HARDWARE:  54-mm Continuum cup, 15-degree elevated liner, +0 36-mm head, 13.5 M/
L taper, standard neck.

 

DESCRIPTION OF PROCEDURE:  The patient was brought to the OR and general 
anesthesia was established.  He was then rolled into the right lateral 
decubitus position. Axillary roll was placed and he was secured to the bed.  
Left hip area was prepped and then draped.  Skin over the incision area was 
infiltrated using 20 cc of 0.5% Marcaine mixed with 2% lidocaine with 
epinephrine.  Total of approximately 50 cc would be used through the case.  
Incision was made centered over the greater trochanter and was carried down 
through skin and subcutaneous tissues. It could be seen already where he was 
bruised a little posteriorly.  Small bleeders encountered were ligated using 
electrocautery.  Charnley retractor was placed and the greater trochanteric 
fascia was taken down using electrocautery.  I thought I could palpate a little 
bit of the short external rotators, but without the leg being out to length 
this was difficult.  Hohmann was placed into the gluteus medius/gluteus minimus 
and electrocautery was used to finish his capsular tear and take down some of 
the short external rotators.  T capsulotomy was made and labrum had to be 
incised a bit, so I could get better access to the head.  Pulling the femur 
anteriorly, little bit of a clean-up cut was taken to help get rid of some of 
the bone, which was impeding my view of the broken femoral head. Using the cork 
screw and then the head slide, I was able to eventually get the head out. Head 
measured 54 mm, which was approximately what I had templated him for on the 
right side as we had a nice AP view of the right hip on his pelvis view.  
Redundant labrum was taken down sharply and beginning with a 46 reamer, he was 
just deepened a little bit and then progressively expanded.  At 53, I had a 
nice side-to-side fit and a 54 cup was called for.  Two screws were placed and 
one had a wonderful bite and the other one had a good bite.  Trial liner was 
placed and attention was turned to the femur.

 

Second clean-up cut was taken and the reciprocating saw tended to break the 
bone a little bit more than having a clean cut.  Box osteotome was used to open 
the femoral canal and the canal finder was easily passed.  Beginning with a 5 
broach, he was progressively broached.  With an 11, I still countersunk and was 
able to play a little bit and this was what I had templated him for.  12.5 was 
then sunk and it felt as if I had a nice bite.  He was trialed with a 0 head 
and his leg length appeared good and he had good stability.  He did tend to 
lever out at approximately 30 degrees of internal rotation when adducted.  
Trial instrumentation was removed, but the 12.5 was loose, so I upped him to a 
13.5 and again took him through range of motion.  At this time, the stem did 
not loosen.  Elevated liner was impacted into place and a 13.5 M/L taper was 
also impacted into place.  He was trialed with a 0 and a minus and I liked his 
leg length better with the 0.  The 13 did not loosen nor did it countersink.  
Head was then impacted into place with a solid thunk.  Hip was again reduced 
and he had the same wonderful motion and stability.  Hip was again copiously 
pulse lavaged.  Capsule was repaired to the posterior aspect of the greater 
trochanter.  Fascia was repaired using interrupted #1 Vicryl sutures.  Wound 
was again copiously pulse lavaged and the subcutaneous tissues approximated 
using 2-0 Vicryl, skin was closed using staples.  After closing the fascia, 
much of the local was injected at that point.  The patient was then rolled on 
to the hospital bed and had his LMA removed in the OR.  He was then stable on 
transfer to the recovery room.

 

 [id number]/[facility] #: [id number]

[email or web address]

## 2018-06-13 ENCOUNTER — HOSPITAL ENCOUNTER (INPATIENT)
Dept: HOSPITAL 25 - PMRU | Age: 78
LOS: 3 days | Discharge: HOME HEALTH | DRG: 561 | End: 2018-06-16
Attending: PHYSICAL MEDICINE & REHABILITATION | Admitting: PHYSICAL MEDICINE & REHABILITATION
Payer: MEDICARE

## 2018-06-13 VITALS — SYSTOLIC BLOOD PRESSURE: 147 MMHG | DIASTOLIC BLOOD PRESSURE: 76 MMHG

## 2018-06-13 DIAGNOSIS — Z47.1: ICD-10-CM

## 2018-06-13 DIAGNOSIS — E78.00: ICD-10-CM

## 2018-06-13 DIAGNOSIS — Z79.899: ICD-10-CM

## 2018-06-13 DIAGNOSIS — Z85.820: ICD-10-CM

## 2018-06-13 DIAGNOSIS — R74.0: ICD-10-CM

## 2018-06-13 DIAGNOSIS — S72.002D: Primary | ICD-10-CM

## 2018-06-13 DIAGNOSIS — Z96.642: ICD-10-CM

## 2018-06-13 DIAGNOSIS — D50.0: ICD-10-CM
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
## 2018-06-13 DIAGNOSIS — I10: ICD-10-CM

## 2018-06-13 LAB
HCT VFR BLD AUTO: 36 % (ref 42–52)
HGB BLD-MCNC: 12 G/DL (ref 14–18)
INR PPP/BLD: 1.37 (ref 0.77–1.02)
PLATELET # BLD AUTO: 218 10^3/UL (ref 150–450)

## 2018-06-13 PROCEDURE — F08Z3ZZ FEEDING/EATING TREATMENT: ICD-10-PCS | Performed by: PHYSICAL MEDICINE & REHABILITATION

## 2018-06-13 PROCEDURE — 85025 COMPLETE CBC W/AUTO DIFF WBC: CPT

## 2018-06-13 PROCEDURE — 36415 COLL VENOUS BLD VENIPUNCTURE: CPT

## 2018-06-13 PROCEDURE — F07Z8ZZ TRANSFER TRAINING TREATMENT: ICD-10-PCS | Performed by: PHYSICAL MEDICINE & REHABILITATION

## 2018-06-13 PROCEDURE — F07Z9ZZ GAIT TRAINING/FUNCTIONAL AMBULATION TREATMENT: ICD-10-PCS | Performed by: PHYSICAL MEDICINE & REHABILITATION

## 2018-06-13 PROCEDURE — 85610 PROTHROMBIN TIME: CPT

## 2018-06-13 PROCEDURE — F07Z5ZZ BED MOBILITY TREATMENT: ICD-10-PCS | Performed by: PHYSICAL MEDICINE & REHABILITATION

## 2018-06-13 PROCEDURE — F08Z1ZZ DRESSING TECHNIQUES TREATMENT: ICD-10-PCS | Performed by: PHYSICAL MEDICINE & REHABILITATION

## 2018-06-13 PROCEDURE — F08Z0ZZ BATHING/SHOWERING TECHNIQUES TREATMENT: ICD-10-PCS | Performed by: PHYSICAL MEDICINE & REHABILITATION

## 2018-06-13 PROCEDURE — 80053 COMPREHEN METABOLIC PANEL: CPT

## 2018-06-13 RX ADMIN — LISINOPRIL SCH MG: 5 TABLET ORAL at 08:20

## 2018-06-13 RX ADMIN — DOCUSATE SODIUM SCH MG: 100 CAPSULE, LIQUID FILLED ORAL at 08:20

## 2018-06-13 RX ADMIN — ATORVASTATIN CALCIUM SCH MG: 40 TABLET, FILM COATED ORAL at 08:20

## 2018-06-13 RX ADMIN — THERA TABS SCH TAB: TAB at 08:21

## 2018-06-13 RX ADMIN — ENOXAPARIN SODIUM SCH MG: 30 INJECTION SUBCUTANEOUS at 14:19

## 2018-06-13 RX ADMIN — MAGNESIUM HYDROXIDE SCH: 400 SUSPENSION ORAL at 08:21

## 2018-06-13 RX ADMIN — OXYCODONE HYDROCHLORIDE AND ACETAMINOPHEN PRN TAB: 5; 325 TABLET ORAL at 05:06

## 2018-06-13 RX ADMIN — DOCUSATE SODIUM SCH: 100 CAPSULE, LIQUID FILLED ORAL at 20:04

## 2018-06-13 NOTE — HP
ADMISSION HISTORY AND PHYSICAL:

 

DATE OF ADMISSION:  06/13/18

 

REASON FOR ADMISSION:  Left hip fracture.

 

HISTORY OF PRESENT ILLNESS:  Mainor Grant is a 77-year-old white male.  He has 
a medical history significant for hypertension as well as an excision of a 
melanoma and had reconstruction of his left ankle in the past.  He somewhere 
around 06/07/18 was getting out of his truck.  He was getting out of the 
flatbed part of his pickup truck.  He fell landing on his left hip.  He was 
helped up and had difficulty walking.  He got back into the house and was able 
to walk with the use of a walker. The hip was causing him a great deal of pain.
  He did not want to go to the hospital.  Finally, his family brought him to 
the hospital on 06/09/18.  He had x- rays taken in the emergency room showing a 
left femoral neck fracture.  He was admitted to the hospital.  He was seen in 
consultation by Orthopedics.  He was taken to the operating room on 

06/11/18 and underwent a total hip replacement on the left side with Dr. Monahan.  Postoperatively, his course was relatively benign. He was put on 
Lovenox for DVT prophylaxis and is bridging to Coumadin.  He was felt to have 
physical therapy and occupational therapy needs.  He is now being admitted for 
inpatient rehab so that he might return to independent living.

 

PAST MEDICAL HISTORY:  Significant for hypertension, hypercholesterolemia, and 
previous left ankle fracture as noted.  He has a history of melanoma excision.

 

CURRENT MEDICATIONS:  Include:

1.  Lipitor.

2.  Lovenox.

3.  Lisinopril.

4.  Percocet.

5.  Coumadin.

 

ALLERGIES:  No known drug allergies.

 

SOCIAL HISTORY:  He is a nonsmoker, nondrinker.  Lives by himself in a 1-story 
house in [place].  He has a daughter living nearby.

 

REVIEW OF SYSTEMS:  The patient reports no current shortness of breath or chest 
pain.

 

                               PHYSICAL EXAMINATION

 

VITAL SIGNS:  The patient's temperature is 98.6, blood pressure is 135/57, 
pulse 99, respirations 20.

 

HEENT:  His extraocular movements were intact.  Tongue is midline.

 

NECK:  Supple with no lymphadenopathy.

 

LUNGS:  Sound clear to auscultation bilaterally.

 

HEART:  Sounds were regular.  S1 and S2 were audible.

 

ABDOMEN:  Soft and nontender.

 

EXTREMITIES:  His left hip has a wound, which is clean and dry.  Peripheral 
pulses were intact.

 

NEUROLOGIC:  He is awake, alert, oriented.  Muscle strength 5/5 in both upper 
and lower extremities except the left hip, which is 3/5 secondary to pain.

 

FUNCTIONAL EXAM:  He transfers with contact guard.

 

 ASSESSMENT:  Left hip fracture, status post left total hip replacement.

 

PLAN:  Our plan is to integrate him into a comprehensive and therapeutic rehab 
program with the following goals:

 

1.  Physical Therapy will work with the patient.  They are going to work on 
functional transfer training, ambulation training with a walker.

2.  Occupational Therapy will see the patient, work on his activities of daily 
living including toileting and toilet transfers.

3.  Lovenox for DVT prophylaxis bridging to Coumadin.

4.  Adequate analgesia.

5.  His bowels will be regulated.

6.   will be closely involved to make sure that any services and 
equipment the patient requires are in place prior to discharge. 7.  Family 
training as appropriate.

8.  Home with appropriate services.

 

ESTIMATED LENGTH OF STAY:  1 week.

 

 

 

[id number]/[facility] #: [id number]

PRAAS

## 2018-06-13 NOTE — DS
CC:  Dr. Hannah *

 

DISCHARGE SUMMARY:

 

DATE OF ADMISSION:  06/09/18

 

DATE OF DISCHARGE:  06/13/18

 

PRIMARY CARE PROVIDER:  Dr. Hannah.

 

ORTHOPEDIC SURGEON:  Dr. Monahan.

 

PRINCIPAL DIAGNOSIS:  Left hip fracture status post left total hip arthroplasty.

 

SECONDARY DIAGNOSES:

1.  Hypertension.

2.  Mild acute blood loss anemia secondary to surgery.

 

DISCHARGE MEDICATIONS:

1.  Lipitor 40 mg p.o. daily.

2.  Aspirin 81 mg p.o. daily.

3.  Lisinopril 5 mg p.o. daily.

4.  Percocet 5/325 mg 1 tab p.o. q.4 hours p.r.n. pain.

5.  Coumadin 5 mg p.o. daily.

6.  Milk of magnesia 30 mL p.o. q.6 hours p.r.n. constipation.

7.  Lovenox 30 mg subcutaneous daily until INR is therapeutic.

8.  Colace 100 mg p.o. b.i.d.

9.  Flexeril 5 mg p.o. t.i.d. p.r.n. spasm.

10.  Dulcolax 10 mg p.r. daily p.r.n. constipation.

11.  Tylenol 650 mg p.o. q.4 hours p.r.n. pain.

 

HOSPITAL COURSE:  Mr. Grant is a 77-year-old male who sustained a fall while 
getting out of his truck approximately 3 days prior to the day of admission.  
He landed on his left hip.  He required assistance to get up and ambulate into 
the house.  He denied any pain while not walking; however, if he moved his leg 
or attempted to walk, he did have some discomfort.  The patient had been taking 
Motrin as needed for pain.  Ultimately, the patient presented to the emergency 
room where he was found to have a left impacted femoral neck fracture 
exhibiting a small degree of varus angulation.  The patient was seen by 
Orthopedics and ultimately taken to the operating room on 06/11/18 where he 
underwent a left total hip arthroplasty.  The patient has been doing extremely 
well postoperatively.  His pain has been minimal.  He has been working with 
physical therapy.  The patient was felt to benefit from short-term rehab and 
has been offered a bed in [facility].  The patient will be discharged there today.

 

On the day of discharge, the patient was awake, alert and oriented, sitting up 
in a chair, eating breakfast, in no acute distress.  Cardiac exam reveals a 
normal S1, S2 with a regular rate and rhythm.  Lungs are clear.  Abdomen is soft
, nontender, nondistended.  The patient's labs do show a drop in his hemoglobin 
and hematocrit to 12/36.  This is a slight drop from the day previous.  The 
patient presented with a normal hemoglobin and hematocrit and therefore, has 
displayed a small degree of acute blood loss anemia.  The patient can have his 
CBC rechecked in the next several days to ensure that this has stabilized.

 

FOLLOWUP CONCERNS:  The patient is being discharged to [facility] today, 06/13/18.

 

ACTIVITY LEVEL:  As tolerated.

 

DIET:  Regular.

 

CONDITION ON DISCHARGE:  Stable.

 

TIME SPENT:  Thirty five minutes were spent discharging this patient.

 

 [id number]/[facility] #: [id number]

PARAS

## 2018-06-14 LAB — INR PPP/BLD: 1.25 (ref 0.77–1.02)

## 2018-06-14 RX ADMIN — ENOXAPARIN SODIUM SCH MG: 30 INJECTION SUBCUTANEOUS at 13:19

## 2018-06-14 RX ADMIN — DOCUSATE SODIUM SCH MG: 100 CAPSULE, LIQUID FILLED ORAL at 09:44

## 2018-06-14 RX ADMIN — DOCUSATE SODIUM SCH: 100 CAPSULE, LIQUID FILLED ORAL at 19:27

## 2018-06-14 RX ADMIN — LISINOPRIL SCH MG: 5 TABLET ORAL at 09:44

## 2018-06-14 RX ADMIN — ATORVASTATIN CALCIUM SCH MG: 40 TABLET, FILM COATED ORAL at 17:23

## 2018-06-14 RX ADMIN — OXYCODONE HYDROCHLORIDE AND ACETAMINOPHEN PRN TAB: 5; 325 TABLET ORAL at 13:19

## 2018-06-14 NOTE — PN
Progress Note


Date of Service: 06/14/18


Note: 


NOEMI SHANNON was visited. Therapy notes read and reviewed. He feels like he 

is doing pretty well but is fatigued. Wound is clean








Current Medications: 


 Active Medications











Generic Name Dose Route Start Last Admin





  Trade Name Freq  PRN Reason Stop Dose Admin


 


Atorvastatin Calcium  40 mg  06/14/18 17:00  06/14/18 17:23





  Lipitor*  PO   40 mg





  1700 SOLITARIO   Administration


 


Docusate Sodium  100 mg  06/13/18 21:00  06/14/18 09:44





  Colace Cap*  PO   100 mg





  BID SOLITARIO   Administration


 


Enoxaparin Sodium  30 mg  06/13/18 13:00  06/14/18 13:19





  Lovenox(*)  SUBCUT   30 mg





  Q24H SOLITARIO   Administration


 


Lisinopril  5 mg  06/14/18 09:00  06/14/18 09:44





  Prinivil Tab*  PO   5 mg





  DAILY SOLITARIO   Administration


 


Magnesium Hydroxide  30 ml  06/13/18 12:17  





  Milk Of Magnesia Liq*  PO   





  Q6H PRN   





  CONSTIPATION   


 


Oxycodone/Acetaminophen  2 tab  06/13/18 12:29  





  Percocet 5/325 Tab*  PO   





  Q4H PRN   





  PAIN - SEVERE   


 


Oxycodone/Acetaminophen  1 tab  06/13/18 12:30  06/14/18 13:19





  Percocet 5/325 Tab*  PO   1 tab





  Q4H PRN   Administration





  PAIN - MODERATE TO SEVERE   


 


Senna  2 tab  06/13/18 12:17  





  Senokot Tab*  PO   





  BEDTIME PRN   





  CONSTIPATION   


 


Warfarin Sodium  3 mg  06/14/18 17:00  06/14/18 17:23





  Coumadin Tab(*)  PO   3 mg





  DAILY@1700 SOLITARIO   Administration





  Protocol   











Vital Signs: 


 Vital Signs











Temp Pulse Resp BP Pulse Ox


 


 98.3 F   90   18   98/59   91 


 


 06/14/18 15:56  06/14/18 15:56  06/14/18 17:25  06/14/18 15:56  06/14/18 15:56











Lab Results: 


 Laboratory Results - last 24 hr











  06/14/18





  05:47


 


INR (Anticoag Therapy)  1.25 H











Exam: 





HEENT: EOMI, face symmetric


LUNGS: Clear bilaterally


HEART: Reg rhythm


ABDOMEN: Soft


EXTREMITIES: Right hip wound C/D/I. PPI


NEUROLOGIC: Awake, oriented times three. Non-focal exam


Assessment/Plan: 





1. Right Hip fracture, S/P TIFFANIE: PT/OT. WBAT. 


2. HTN: Lisinopril


3. DVT Prophylaxis: Lovenox bridge to Coumadin.


4. Analgesia: Percocet/Tylenol


5. Advanced Directives: Full Code


 


06/14/18 19:03

## 2018-06-15 LAB
BASOPHILS # BLD AUTO: 0 10^3/UL (ref 0–0.2)
EOSINOPHIL # BLD AUTO: 0.4 10^3/UL (ref 0–0.6)
HCT VFR BLD AUTO: 35 % (ref 42–52)
HGB BLD-MCNC: 12 G/DL (ref 14–18)
INR PPP/BLD: 1.35 (ref 0.77–1.02)
LYMPHOCYTES # BLD AUTO: 1.1 10^3/UL (ref 1–4.8)
MCH RBC QN AUTO: 32 PG (ref 27–31)
MCHC RBC AUTO-ENTMCNC: 35 G/DL (ref 31–36)
MCV RBC AUTO: 92 FL (ref 80–94)
MONOCYTES # BLD AUTO: 1.2 10^3/UL (ref 0–0.8)
NEUTROPHILS # BLD AUTO: 7.6 10^3/UL (ref 1.5–7.7)
NRBC # BLD AUTO: 0 10^3/UL
NRBC BLD QL AUTO: 0.1
PLATELET # BLD AUTO: 275 10^3/UL (ref 150–450)
RBC # BLD AUTO: 3.76 10^6/UL (ref 4–5.4)
WBC # BLD AUTO: 10.4 10^3/UL (ref 3.5–10.8)

## 2018-06-15 RX ADMIN — LISINOPRIL SCH MG: 5 TABLET ORAL at 08:01

## 2018-06-15 RX ADMIN — ENOXAPARIN SODIUM SCH MG: 30 INJECTION SUBCUTANEOUS at 14:49

## 2018-06-15 RX ADMIN — OXYCODONE HYDROCHLORIDE AND ACETAMINOPHEN PRN TAB: 5; 325 TABLET ORAL at 08:01

## 2018-06-15 RX ADMIN — ATORVASTATIN CALCIUM SCH MG: 40 TABLET, FILM COATED ORAL at 16:45

## 2018-06-15 RX ADMIN — DOCUSATE SODIUM SCH MG: 100 CAPSULE, LIQUID FILLED ORAL at 08:02

## 2018-06-15 RX ADMIN — DOCUSATE SODIUM SCH: 100 CAPSULE, LIQUID FILLED ORAL at 19:04

## 2018-06-15 NOTE — PMRUTEAM
PMRU: Team Meeting


Current Status: 


 Nursing: Current Status











Skin Deviations [Left Hip]     Incision


 


Skin Deviation Description [   staples





Left Hip]                      











 Physical Therapy: Current Status











Bed Mobility Assistance        Independent


 


Transfer Moblility Assistance  Supervision


 


Transfer/Bed Mobility          Rolling Walker





Recommended Devices            


 


Ambulation Assistance          Supervision


 


Ambulation Assistive Devices   Rolling Walker


 


Number of Feet Patient         150.





Ambulated                      


 


Stairs Assistance              Mod Assist


 


Stairs Recommended Devices     One Rail


 


Number of Stairs               1


 


Objective Comments             patient has great difficulty inascent of stairs





 with L side rail only  ascending.  will need to





 problem solve mobility, or install new rail.  will





 work on cane and rail combination next visit.





 klaus is unable to attempt this date.














 Occupational Therapy: Current Status











Upper Body Dressing            Independent


 


Lower Body Dressing            Supervision


 


Bathing                        Supervision


 


Toileting                      Supervision


 


Toilet Transfer                Supervision


 


Shower Transfer                Supervision


 


Eating                         Independent














 Rec Therapy: Current Status











Summary of Assessment and      Pt. was very engaged in conversation and had a





Clinical Impression            great sense of humor.  Pt. identified with many





 interests and active involvement in them prior to





 admission.  Pt. was open to continued leisure





 visits while on the unit.


 


Treatment Goals                Pt. will engage in leisure activities while on 

the





 unit.


 


Treatment Plan                 Provide RT services and encourage involvement.














 Social Work: Current Status











Discharge Plan                 return home with home care svs and family support


 


Potential for Family Training  pt's children are involved and support


 


Anticipated Discharge          Home





Destination                    


 


Discharge With                 home care svs and family support

















Goals: 


 Physical Therapy: Updated Goals





independent bed mobility, transfers, and ambulation with rolling walker.  Up/

down 4 stairs with rails.





 Occupational Therapy: Initial Goals











Goals to be Completed in (Days 5 days





)                              


 


Upper Body Bathing Routine     Modified Independent with


 


Lower Body Bathing Routine     Modified Independent with


 


Upper Body Dressing Routine    Independent


 


Lower Body Dressing Routine    Modified Independent with


 


Toilet Hygeine and Clothing    Modified Independent with





Management Routine             


 


Toilet Transfer Routine        Modified Independent with


 


Tub Transfer Routine           Modified Independent with


 


Functional Transfers for ADL   Modified Independent with


 


Grooming Routine               Independent


 


Feeding Routine                Modified Independent with














 Nutrition: Goals











Intervention Goals             1.  adequate po intake to support post-op healing





 and lean body mass without add'l wt gain





 2.  regulation of post-op bowel pattern; no c/o





 constipation (or diarrhea)





 3.  pt questions re: Coumadin/vit K education will





 be addressed prior to d/c





 











 Social Work: Goals











Discharge Plan                 return home with home care svs and family support


 


Potential for Family Training  pt's children are involved and support


 


Anticipated Discharge          Home





Destination                    


 


Discharge With                 home care svs and family support

















Care Plan: 


 Care Plan





ADL's - Improve/Maintain                Start:  06/13/18 14:59              


Freq:   DAILY                           Status: Active      Target:         


Protocol:                                                                   








Activity Type Activity Date Activity User E-Sign Co-Sign Detail





Recorded Client Recorded Date Recorded By   


 


Document 06/13/18 14:59 [id number]   





[id number]-C08 06/13/18 14:59 [id number]   














  06/13/18





  14:59


 


PMRU Outcome: ADL's/ADL Transfers 


 


Orders/Interventions Occupational





 Therapy





 Evaluation &





 Treatment


 


Device Yes


 


Patient to receive OT 5x/wk for  Therex





min/day Self Care





 Management





 Group Therapy





 Neuromuscular





 ReEducation


 


UE/LE ADL's with Assist Yes


 


ADL Transfers with Assist Yes


 


Toileting: Transfers,Clothing Management Yes





,Hygeine w/Assist 


 


Light Kitchen/Laundry w/Assist Yes


 


Progression Toward Outcome/Goals Progressing








DVT Prophylaxis- Improve/Maintain       Start:  06/13/18 13:52              


Freq:   DAILY                           Status: Active      Target:         


Protocol:                                                                   








Activity Type Activity Date Activity User E-Sign Co-Sign Detail





Recorded Client Recorded Date Recorded By   


 


Document 06/15/18 01:38 [id number]   





PMRU-C03 06/15/18 01:38 [id number]   














  06/15/18





  01:38


 


PMRU Outcome: DVT Prophylaxis 


 


Outcome/Goals Remains Free of





 DVT





 Complies with





 DVT Prophylaxis





 /Treatment





 Demonstrates





 Knowledge of





 DVT Prevention/





 Treatment





 TEDS Stockings





 on Every AM,





 Off at HS


 


Progression Toward Outcome/Goals Progressing








Discharge Planning - Improve/Maintain   Start:  06/13/18 13:52              


Freq:   DAILY                           Status: Active      Target:         


Protocol:                                                                   








Activity Type Activity Date Activity User E-Sign Co-Sign Detail





Recorded Client Recorded Date Recorded By   


 


Document 06/15/18 01:00 [id number]   





PMRU-C03 06/15/18 01:38 [id number]   














  06/15/18





  01:00


 


PMRU Outcome: Discharge Planning 


 


Update Patient Family No


 


Outcome/Goals Demonstrates





 Understanding





 of Discharge





 Plan


 


Progression Toward Outcome/Goals Progressing








Education-Improve/Maintain              Start:  06/13/18 13:52              


Freq:   DAILY                           Status: Active      Target:         


Protocol:                                                                   








Activity Type Activity Date Activity User E-Sign Co-Sign Detail





Recorded Client Recorded Date Recorded By   


 


Document 06/15/18 01:38 [id number]   





[id number]-C03 06/15/18 01:38 [id number]   














  06/15/18





  01:38


 


PMRU Outcome: Education 


 


Outcome/Goals Demonstrate/





 Verbalize





 Understanding





 of Written





 Discharge





 Instructions





 Demonstrates





 Skills





 Encourage





 Questions


 


Progression Toward Outcome/Goals Progressing








Medication Administration               Start:  06/13/18 13:52              


Freq:   DAILY                           Status: Active      Target:         


Protocol:                                                                   








Activity Type Activity Date Activity User E-Sign Co-Sign Detail





Recorded Client Recorded Date Recorded By   


 


Document 06/15/18 01:38 [id number]   





PMRU-C03 06/15/18 01:38 [id number]   














  06/15/18





  01:38


 


PMRU Outcome: Medication Administration 


 


Assess Patient Knowledge/Teach Med Yes





Education for all Meds 


 


Outcome/Goals Patient





 Independent





 with Medication





 Administration





 at Home





 Demonstrates





 Understanding


 


Progression Towards Outcome/Goals Progressing


 


Is Patient Going Home on Lovenox? No








Mobility- Improve/Maintain              Start:  06/13/18 19:40              


Freq:   DAILY                           Status: Active      Target:         


Protocol:                                                                   








Activity Type Activity Date Activity User E-Sign Co-Sign Detail





Recorded Client Recorded Date Recorded By   


 


Document 06/13/18 19:40 [id number]   





[id number]-C08 06/13/18 19:42 [id number]   














  06/13/18





  19:40


 


PMRU Outcome: Mobility 


 


Physical Therapy Evaluation and Yes





Treatment 


 


Activity OOB with Assistance Yes


 


WBAT Yes


 


Device Yes


 


Assistance Yes


 


Patient to be seen 5x/wk for  min/ Therex





day for: Mobility





 Training





 Gait Training





 Balance





 Other


 


Outcome/Goals Maintain/





 Achieve





 Baseline





 Mobility Status





 Improve





 Mobility Status





 Demonstrates





 Proper Use of





 Assistive





 Devices





 Free from





 Complications





 of Immobility


 


Bed Mobility Yes:





 independent


 


Transfers Yes:





 independent





 with rolling





 walker.


 


Gait x ft Yes:





 independent





 with rolling





 walker 150'


 


Up/Down Stairs Yes:





 independent up/





 down 5 with





 rails, or rail





 and cane.








Pain/Comfort- Improve/Maintain          Start:  06/13/18 13:52              


Freq:   DAILY                           Status: Active      Target:         


Protocol:                                                                   








Activity Type Activity Date Activity User E-Sign Co-Sign Detail





Recorded Client Recorded Date Recorded By   


 


Document 06/15/18 00:26 [id number]   





PMRU-C07 06/15/18 00:29 [id number]   














  06/15/18





  00:26


 


PMRU Outcome: Pain/Comfort 


 


Outcome/Goals Demonstrates





 Knowledge and





 Use of





 Available





 Comfort





 Measures





 Achieves





 Acceptable





 Comfort/Pain





 Level as





 Determined by





 Patient/Condit





 Maintain





 Comfort Level





 Allowing





 Patient to





 Fully





 Participate in





 Rehab


 


Progression Toward Outcome/Goals Progressing








Safety- Improve/Maintain                Start:  06/13/18 13:52              


Freq:   DAILY                           Status: Active      Target:         


Protocol:                                                                   








Activity Type Activity Date Activity User E-Sign Co-Sign Detail





Recorded Client Recorded Date Recorded By   


 


Document 06/15/18 00:26 [id number]   





PMRU-C07 06/15/18 00:29 [id number]   














  06/15/18





  00:26


 


PMRU Outcome: Safety 


 


Outcome/Goals Remain Free of





 Injury or Harm





 Cooperates with





 Safety





 Measures for





 Least





 Restrictive





 Environment





 Prevent Falls/





 Injury


 


Progression Toward Outcome/Goals Progressing








Skin- Improve/Maintain                  Start:  06/13/18 13:52              


Freq:   DAILY                           Status: Active      Target:         


Protocol:                                                                   








Activity Type Activity Date Activity User E-Sign Co-Sign Detail





Recorded Client Recorded Date Recorded By   


 


Document 06/15/18 00:26 [id number]   





PMRU-C07 06/15/18 00:29 [id number]   














  06/15/18





  00:26


 


PMRU Outcome: Skin 


 


Skin Risk Level Medium


 


Skin Orders Dressing Change


 


Outcome/Goals Maintain/





 Improve Skin





 Intergrity





 Surgical





 Incisions





 Healing


 


Progression Toward Outcome/Goals Progressing














Medicine Note: 








Length of Stay:  [3 days]





Anticipated Discharge Destination: Home





Tentative Discharge Date:  [6/18/18]





Discharged to:  [home]

## 2018-06-15 NOTE — PN
Progress Note


Date of Service: 06/15/18


Note: 


NOEMI SHANNON was visited. Nursing and therapy notes read and reviewed.  Has 

mild pain at left hip with transfers, but otherwise feels pretty good. Daughter 

is here visiting.  No chest pain, shortness of breath or abdominal pain.








Current Medications: 


 Active Medications











Generic Name Dose Route Start Last Admin





  Trade Name Freq  PRN Reason Stop Dose Admin


 


Atorvastatin Calcium  40 mg  06/14/18 17:00  06/14/18 17:23





  Lipitor*  PO   40 mg





  1700 SOLITARIO   Administration


 


Docusate Sodium  100 mg  06/13/18 21:00  06/15/18 08:02





  Colace Cap*  PO   100 mg





  BID SOLITARIO   Administration


 


Enoxaparin Sodium  30 mg  06/13/18 13:00  06/14/18 13:19





  Lovenox(*)  SUBCUT   30 mg





  Q24H SOLITARIO   Administration


 


Lisinopril  5 mg  06/14/18 09:00  06/15/18 08:01





  Prinivil Tab*  PO   5 mg





  DAILY SOLITARIO   Administration


 


Magnesium Hydroxide  30 ml  06/13/18 12:17  





  Milk Of Magnesia Liq*  PO   





  Q6H PRN   





  CONSTIPATION   


 


Oxycodone/Acetaminophen  2 tab  06/13/18 12:29  





  Percocet 5/325 Tab*  PO   





  Q4H PRN   





  PAIN - SEVERE   


 


Oxycodone/Acetaminophen  1 tab  06/13/18 12:30  06/15/18 08:01





  Percocet 5/325 Tab*  PO   1 tab





  Q4H PRN   Administration





  PAIN - MODERATE TO SEVERE   


 


Senna  2 tab  06/13/18 12:17  





  Senokot Tab*  PO   





  BEDTIME PRN   





  CONSTIPATION   


 


Warfarin Sodium  3 mg  06/14/18 17:00  06/14/18 17:23





  Coumadin Tab(*)  PO   3 mg





  DAILY@1700 SOLITARIO   Administration





  Protocol   











Vital Signs: 


 Vital Signs











Temp Pulse Resp BP Pulse Ox


 


 98.4 F   83   16   130/66   92 


 


 06/15/18 05:16  06/15/18 05:16  06/15/18 08:01  06/15/18 05:16  06/15/18 05:16











Lab Results: 


 Laboratory Results - last 24 hr











  06/15/18 06/15/18 06/15/18





  06:02 06:02 06:02


 


WBC  10.4  


 


RBC  3.76 L  


 


Hgb  12.0 L  


 


Hct  35 L  


 


MCV  92  


 


MCH  32 H  


 


MCHC  35  


 


RDW  14  


 


Plt Count  275  


 


MPV  7.6  


 


Neut % (Auto)  72.9  


 


Lymph % (Auto)  10.8 L  


 


Mono % (Auto)  11.9 H  


 


Eos % (Auto)  4.0  


 


Baso % (Auto)  0.4  


 


Absolute Neuts (auto)  7.6  


 


Absolute Lymphs (auto)  1.1  


 


Absolute Monos (auto)  1.2 H  


 


Absolute Eos (auto)  0.4  


 


Absolute Basos (auto)  0  


 


Absolute Nucleated RBC  0  


 


Nucleated RBC %  0.1  


 


INR (Anticoag Therapy)    1.35 H


 


Sodium   137 


 


Potassium   3.8 


 


Chloride   105 


 


Carbon Dioxide   25 


 


Anion Gap   7 


 


BUN   25 H 


 


Creatinine   0.94 


 


Est GFR ( Amer)   100.1 


 


Est GFR (Non-Af Amer)   77.8 


 


BUN/Creatinine Ratio   26.6 H 


 


Glucose   107 H 


 


Calcium   8.3 L 


 


Total Bilirubin   0.80 


 


AST   50 H 


 


ALT   58 H 


 


Alkaline Phosphatase   66 


 


Total Protein   5.5 L 


 


Albumin   2.8 L 


 


Globulin   2.7 


 


Albumin/Globulin Ratio   1.0 











Exam: 





GEN: no acute distress. Alert and appropriate.


LUNGS: Clear to auscultation bilaterally


HEART: Regular rate and rhythm


ABDOMEN: Soft, non-tender, non-distended, + bowel sounds


EXTREMITIES: some mild left ankle edema. no calf pain.








Assessment/Plan: 


76yo man left hip fracture





1. Left Hip fracture, S/P TIFFANIE: PT/OT. WBAT. 


2. HTN: Lisinopril


3. DVT Prophylaxis: Lovenox bridge to Coumadin. Continue 3mg coumadin tonight. 

INR in AM.


4. Analgesia: Percocet/Tylenol


5. Advanced Directives: Full Code


6. Estimated LOS: meet with team today for IPOC meeting








06/15/18 11:31

## 2018-06-16 VITALS — SYSTOLIC BLOOD PRESSURE: 128 MMHG | DIASTOLIC BLOOD PRESSURE: 67 MMHG

## 2018-06-16 LAB — INR PPP/BLD: 1.78 (ref 0.77–1.02)

## 2018-06-16 RX ADMIN — LISINOPRIL SCH MG: 5 TABLET ORAL at 09:32

## 2018-06-16 RX ADMIN — DOCUSATE SODIUM SCH MG: 100 CAPSULE, LIQUID FILLED ORAL at 09:32

## 2018-06-16 NOTE — PN
Progress Note


Date of Service: 06/16/18


Note: 


NOEMI SHANNON was visited. Nursing and therapy notes read and reviewed. No 

chest pain, shortness of breath or abdominal pain.  He is eager to go home 

today.








Current Medications: 


 Active Medications











Generic Name Dose Route Start Last Admin





  Trade Name Freq  PRN Reason Stop Dose Admin


 


Atorvastatin Calcium  40 mg  06/14/18 17:00  06/15/18 16:45





  Lipitor*  PO   40 mg





  [facility]   Administration


 


Docusate Sodium  100 mg  06/13/18 21:00  06/16/18 09:32





  Colace Cap*  PO   100 mg





  BID [facility]   Administration


 


Lisinopril  5 mg  06/14/18 09:00  06/16/18 09:32





  Prinivil Tab*  PO   5 mg





  DAILY [facility]   Administration


 


Magnesium Hydroxide  30 ml  06/13/18 12:17  06/16/18 09:32





  Milk Of Magnesia Liq*  PO   30 ml





  Q6H PRN   Administration





  CONSTIPATION   


 


Oxycodone/Acetaminophen  2 tab  06/13/18 12:29  





  Percocet 5/325 Tab*  PO   





  Q4H PRN   





  PAIN - SEVERE   


 


Oxycodone/Acetaminophen  1 tab  06/13/18 12:30  06/15/18 08:01





  Percocet 5/325 Tab*  PO   1 tab





  Q4H PRN   Administration





  PAIN - MODERATE TO SEVERE   


 


Senna  2 tab  06/13/18 12:17  





  Senokot Tab*  PO   





  BEDTIME PRN   





  CONSTIPATION   


 


Warfarin Sodium  4 mg  06/15/18 17:00  06/15/18 16:45





  Coumadin Tab(*)  PO  06/16/18 23:59  4 mg





  DAILY@[facility]   Administration





  Protocol   


 


Warfarin Sodium  3 mg  06/17/18 17:00  





  Coumadin Tab(*)  PO   





  DAILY@[facility]   





  Protocol   











Vital Signs: 


 Vital Signs











Temp Pulse Resp BP Pulse Ox


 


 99.9 F   76   20   128/67   91 


 


 06/16/18 05:36  06/16/18 05:36  06/16/18 05:36  06/16/18 05:36  06/16/18 05:36











Lab Results: 


 Laboratory Results - last 24 hr











  06/16/18 06/16/18





  05:58 05:58


 


INR (Anticoag Therapy)  1.78 H 


 


Sodium   138


 


Potassium   3.9


 


Chloride   104


 


Carbon Dioxide   27


 


Anion Gap   7


 


BUN   24


 


Creatinine   0.98


 


Est GFR ( Amer)   95.4


 


Est GFR (Non-Af Amer)   74.2


 


BUN/Creatinine Ratio   24.5 H


 


Glucose   100


 


Calcium   8.6


 


Total Bilirubin   0.80


 


AST   74 H


 


ALT   95 H


 


Alkaline Phosphatase   73


 


Total Protein   5.8 L


 


Albumin   3.0 L


 


Globulin   2.8


 


Albumin/Globulin Ratio   1.1











Exam: 





GEN: no acute distress. Alert and appropriate.


LUNGS: Clear to auscultation bilaterally


HEART: Regular rate and rhythm


ABDOMEN: Soft, non-tender, non-distended, + bowel sounds


EXTREMITIES: Left lower extremity edema. no calf pain.








Assessment/Plan: 


76yo man left hip fracture





1. Left Hip fracture, S/P TIFFANIE: WBAT. f/u with Dr. Monahan


2. HTN: Lisinopril


3. DVT Prophylaxis: Given left leg edema will get doppler today.  If negative, 

plan to d/c lovenox as he is close to therapeutic on coumadin and is going home 

today.  If he has a dvt he will continue lovenox at a higher dose until INR >2 

and continue coumadin. He is to have home lab draws after discharge.


4. Analgesia: Percocet/Tylenol


5. Advanced Directives: Full Code


6. Estimated LOS: discharge home likely after doppler results.








06/16/18 09:52

## 2018-06-16 NOTE — DS
CC:  Dr. Monahan; Dr. Lozoya

 

REHABILITATION DISCHARGE NOTE:

 

DATE OF ADMISSION:  06/13/18

 

DATE OF DISCHARGE:  06/16/18

 

PRIMARY CARE PHYSICIAN:  Dr. Lozoya.

 

ORTHOPEDIC SURGEON:  Dr. Monahan.

 

REASON FOR ADMISSION:  Left hip fracture.

 

HISTORY OF PRESENT ILLNESS:  For full details of his acute hospitalization 
leading up to his admission, please see the note dictated by Dr. Kearney on 06/
13/18.

 

REHABILITATION COURSE:  During his time on the PMRU, he has experienced minimal 
level of pain.  He has only used Percocet once a day over the last 2 days and 
takes home a small prescription.  It was noted that he has mild elevation of 
his AST and ALT.  It has been requested of visiting nurse services that he have 
a complete metabolic panel drawn next week with results going to Dr. Lozoya for 
further evaluation and followup.  His AST was 74 and ALT 95 on 06/16/18.  He 
was on Lovenox bridging to Coumadin for DVT prophylaxis.  On the day of 
discharge, his INR is 1.78 and the Lovenox is being discontinued.  He is to 
take 4 mg of Coumadin tonight and then 3 mg tomorrow since it appears to be on 
the rise.  He will have an INR drawn on Monday and Thursday with results going 
to Dr. Monahan.  He has had some left lower extremity swelling and edema 
without any pain.  On 06/16/18, left lower extremity venous Doppler was 
negative for DVT.  On the PMRU, he participated well with physical therapy.  At 
the time of discharge, he is independent with bed mobility, transfers with a 
rolling walker, ambulating with a rolling walker 150 feet and is able to climb 
12 stairs up and down with 1 rail and a cane.  He is aware of his hip 
precautions.  He also participated well with occupational therapy and at the 
time of discharge, is independent eating.  He is independent bathing with a tub 
transfer bench and using a long handled sponge to clean his left lower 
extremity.  He is independent dressing with adaptive equipment for his lower 
body. He is independent with commode over the toilet and walker.  He is 
independent with simple home management tasks.

 

DISCHARGE CONDITION:  Good.

 

DISCHARGE DISPOSITION:  Home with family support.

 

DISCHARGE MEDICATIONS:

1.  Docusate 100 mg b.i.d.

2.  Percocet 5/325 mg 1 tablet q.6 hours p.r.n. pain, wean off as tolerated.

3.  Warfarin, he will take 4 mg tonight and 3 mg daily starting tomorrow.

4.  Acetaminophen 650 mg q.4 hours p.r.n. pain.

5.  Atorvastatin 40 mg daily.

6.  Lisinopril 5 mg daily.

 

DISCHARGE DIAGNOSES:

1.  Left hip fracture, status post left total hip replacement.

2.  Hypertension.

3.  Mild acute postoperative anemia.

4.  Transaminitis.

 

 [id number]/[facility] #: [id number]

[facility] Center[facility]ARINO

## 2018-06-16 NOTE — RAD
HISTORY: left leg swelling after hip fracture



COMPARISONS: None relevant



TECHNIQUE: Multiple transverse and longitudinal ultrasound images were obtained of the

left lower extremity from the level of the common femoral vein inferiorly through to the

infrapopliteal veins  using grayscale, color Doppler, and spectral Doppler imaging with

and without compression and with augmentation. Comparison images were obtained of the

contralateral common femoral vein.



FINDINGS:

VEINS: The calf veins are not well-visualized. The remainder of the venous system of the

left lower extremity is compressible throughout its course, with normal flow on color

Doppler imaging and normal response to augmentation on spectral Doppler imaging.



SOFT TISSUES: Unremarkable.



OTHER FINDINGS: None.



IMPRESSION: 

THE CALF VEINS ARE NOT WELL-VISUALIZED. ELSEWHERE, THERE IS NO LEFT LOWER EXTREMITY DEEP

VEIN THROMBOSIS